# Patient Record
Sex: FEMALE | Race: WHITE | NOT HISPANIC OR LATINO
[De-identification: names, ages, dates, MRNs, and addresses within clinical notes are randomized per-mention and may not be internally consistent; named-entity substitution may affect disease eponyms.]

---

## 2017-08-25 ENCOUNTER — APPOINTMENT (OUTPATIENT)
Dept: SURGERY | Facility: CLINIC | Age: 37
End: 2017-08-25
Payer: COMMERCIAL

## 2017-08-25 VITALS — HEIGHT: 63 IN | WEIGHT: 170 LBS | BODY MASS INDEX: 30.12 KG/M2

## 2017-08-25 DIAGNOSIS — M67.441 GANGLION, RIGHT HAND: ICD-10-CM

## 2017-08-25 PROCEDURE — 99202 OFFICE O/P NEW SF 15 MIN: CPT

## 2017-08-25 RX ORDER — CEFADROXIL 500 MG/1
500 CAPSULE ORAL
Qty: 14 | Refills: 0 | Status: ACTIVE | COMMUNITY
Start: 2017-03-30

## 2017-08-25 RX ORDER — AMOXICILLIN 500 MG/1
500 CAPSULE ORAL
Qty: 21 | Refills: 0 | Status: ACTIVE | COMMUNITY
Start: 2017-08-05

## 2017-08-25 RX ORDER — CEFUROXIME AXETIL 500 MG/1
500 TABLET ORAL
Qty: 20 | Refills: 0 | Status: ACTIVE | COMMUNITY
Start: 2017-03-30

## 2017-08-25 RX ORDER — ALPRAZOLAM 0.25 MG/1
0.25 TABLET ORAL
Qty: 21 | Refills: 0 | Status: ACTIVE | COMMUNITY
Start: 2017-03-24

## 2017-08-25 RX ORDER — SERTRALINE HYDROCHLORIDE 100 MG/1
100 TABLET, FILM COATED ORAL
Qty: 90 | Refills: 0 | Status: ACTIVE | COMMUNITY
Start: 2017-06-12

## 2017-08-26 PROBLEM — M67.441 GANGLION OF HAND, RIGHT: Status: ACTIVE | Noted: 2017-08-26

## 2017-09-26 ENCOUNTER — EMERGENCY (EMERGENCY)
Facility: HOSPITAL | Age: 37
LOS: 1 days | Discharge: HOME | End: 2017-09-26
Admitting: INTERNAL MEDICINE
Payer: COMMERCIAL

## 2017-09-26 PROCEDURE — 76815 OB US LIMITED FETUS(S): CPT | Mod: 26

## 2017-09-26 PROCEDURE — 80048 BASIC METABOLIC PNL TOTAL CA: CPT

## 2017-09-26 PROCEDURE — 81025 URINE PREGNANCY TEST: CPT

## 2017-09-26 PROCEDURE — 86850 RBC ANTIBODY SCREEN: CPT

## 2017-09-26 PROCEDURE — 76815 OB US LIMITED FETUS(S): CPT

## 2017-09-26 PROCEDURE — 76817 TRANSVAGINAL US OBSTETRIC: CPT | Mod: 26

## 2017-09-26 PROCEDURE — 99285 EMERGENCY DEPT VISIT HI MDM: CPT

## 2017-09-26 PROCEDURE — 84702 CHORIONIC GONADOTROPIN TEST: CPT

## 2017-09-26 PROCEDURE — 76817 TRANSVAGINAL US OBSTETRIC: CPT

## 2017-09-26 PROCEDURE — 36415 COLL VENOUS BLD VENIPUNCTURE: CPT

## 2017-09-26 PROCEDURE — 82150 ASSAY OF AMYLASE: CPT

## 2017-09-26 PROCEDURE — 85610 PROTHROMBIN TIME: CPT

## 2017-09-26 PROCEDURE — 83690 ASSAY OF LIPASE: CPT

## 2017-09-26 PROCEDURE — 85027 COMPLETE CBC AUTOMATED: CPT

## 2017-09-26 PROCEDURE — 85730 THROMBOPLASTIN TIME PARTIAL: CPT

## 2017-09-26 PROCEDURE — 99284 EMERGENCY DEPT VISIT MOD MDM: CPT | Mod: 25

## 2017-09-26 PROCEDURE — 86900 BLOOD TYPING SEROLOGIC ABO: CPT

## 2017-09-26 PROCEDURE — 80076 HEPATIC FUNCTION PANEL: CPT

## 2017-09-26 PROCEDURE — 81003 URINALYSIS AUTO W/O SCOPE: CPT

## 2017-10-02 ENCOUNTER — RESULT REVIEW (OUTPATIENT)
Age: 37
End: 2017-10-02

## 2017-10-30 ENCOUNTER — APPOINTMENT (OUTPATIENT)
Dept: ANTEPARTUM | Facility: CLINIC | Age: 37
End: 2017-10-30
Payer: COMMERCIAL

## 2017-10-30 ENCOUNTER — ASOB RESULT (OUTPATIENT)
Age: 37
End: 2017-10-30

## 2017-10-30 PROCEDURE — 76801 OB US < 14 WKS SINGLE FETUS: CPT

## 2017-10-30 PROCEDURE — 76813 OB US NUCHAL MEAS 1 GEST: CPT

## 2017-11-04 ENCOUNTER — EMERGENCY (EMERGENCY)
Facility: HOSPITAL | Age: 37
LOS: 1 days | Discharge: HOME | End: 2017-11-04
Admitting: EMERGENCY MEDICINE
Payer: COMMERCIAL

## 2017-11-04 PROCEDURE — 99284 EMERGENCY DEPT VISIT MOD MDM: CPT

## 2017-11-05 PROCEDURE — 86850 RBC ANTIBODY SCREEN: CPT

## 2017-11-05 PROCEDURE — 85610 PROTHROMBIN TIME: CPT

## 2017-11-05 PROCEDURE — 86900 BLOOD TYPING SEROLOGIC ABO: CPT

## 2017-11-05 PROCEDURE — 99284 EMERGENCY DEPT VISIT MOD MDM: CPT | Mod: 25

## 2017-11-05 PROCEDURE — 85730 THROMBOPLASTIN TIME PARTIAL: CPT

## 2017-11-05 PROCEDURE — 87086 URINE CULTURE/COLONY COUNT: CPT

## 2017-11-05 PROCEDURE — 96374 THER/PROPH/DIAG INJ IV PUSH: CPT

## 2017-11-05 PROCEDURE — 96375 TX/PRO/DX INJ NEW DRUG ADDON: CPT

## 2017-11-05 PROCEDURE — 81003 URINALYSIS AUTO W/O SCOPE: CPT

## 2017-11-05 PROCEDURE — 80053 COMPREHEN METABOLIC PANEL: CPT

## 2017-11-05 PROCEDURE — 85027 COMPLETE CBC AUTOMATED: CPT

## 2017-11-05 PROCEDURE — 84703 CHORIONIC GONADOTROPIN ASSAY: CPT

## 2018-01-04 ENCOUNTER — APPOINTMENT (OUTPATIENT)
Dept: ANTEPARTUM | Facility: CLINIC | Age: 38
End: 2018-01-04

## 2018-01-10 ENCOUNTER — APPOINTMENT (OUTPATIENT)
Dept: ANTEPARTUM | Facility: CLINIC | Age: 38
End: 2018-01-10
Payer: COMMERCIAL

## 2018-01-10 ENCOUNTER — ASOB RESULT (OUTPATIENT)
Age: 38
End: 2018-01-10

## 2018-01-10 PROCEDURE — 76811 OB US DETAILED SNGL FETUS: CPT

## 2018-01-10 PROCEDURE — 76817 TRANSVAGINAL US OBSTETRIC: CPT

## 2018-01-31 ENCOUNTER — OUTPATIENT (OUTPATIENT)
Dept: OUTPATIENT SERVICES | Facility: HOSPITAL | Age: 38
LOS: 1 days | End: 2018-01-31
Payer: COMMERCIAL

## 2018-01-31 DIAGNOSIS — Z3A.00 WEEKS OF GESTATION OF PREGNANCY NOT SPECIFIED: ICD-10-CM

## 2018-01-31 DIAGNOSIS — O26.899 OTHER SPECIFIED PREGNANCY RELATED CONDITIONS, UNSPECIFIED TRIMESTER: ICD-10-CM

## 2018-01-31 LAB
APPEARANCE UR: CLEAR — SIGNIFICANT CHANGE UP
BILIRUB UR-MCNC: NEGATIVE — SIGNIFICANT CHANGE UP
COLOR SPEC: SIGNIFICANT CHANGE UP
DIFF PNL FLD: NEGATIVE — SIGNIFICANT CHANGE UP
GLUCOSE UR QL: NEGATIVE — SIGNIFICANT CHANGE UP
KETONES UR-MCNC: NEGATIVE — SIGNIFICANT CHANGE UP
LEUKOCYTE ESTERASE UR-ACNC: NEGATIVE — SIGNIFICANT CHANGE UP
NITRITE UR-MCNC: NEGATIVE — SIGNIFICANT CHANGE UP
PH UR: 6.5 — SIGNIFICANT CHANGE UP (ref 5–8)
PROT UR-MCNC: NEGATIVE — SIGNIFICANT CHANGE UP
SP GR SPEC: 1.02 — SIGNIFICANT CHANGE UP (ref 1.01–1.02)
UROBILINOGEN FLD QL: NEGATIVE — SIGNIFICANT CHANGE UP

## 2018-01-31 PROCEDURE — 81003 URINALYSIS AUTO W/O SCOPE: CPT

## 2018-01-31 PROCEDURE — 87086 URINE CULTURE/COLONY COUNT: CPT

## 2018-01-31 PROCEDURE — 59025 FETAL NON-STRESS TEST: CPT

## 2018-01-31 PROCEDURE — G0463: CPT

## 2018-02-01 LAB
CULTURE RESULTS: NO GROWTH — SIGNIFICANT CHANGE UP
SPECIMEN SOURCE: SIGNIFICANT CHANGE UP

## 2018-02-06 ENCOUNTER — APPOINTMENT (OUTPATIENT)
Dept: ANTEPARTUM | Facility: CLINIC | Age: 38
End: 2018-02-06
Payer: COMMERCIAL

## 2018-02-06 ENCOUNTER — ASOB RESULT (OUTPATIENT)
Age: 38
End: 2018-02-06

## 2018-02-06 PROCEDURE — 76816 OB US FOLLOW-UP PER FETUS: CPT

## 2018-02-06 PROCEDURE — 76817 TRANSVAGINAL US OBSTETRIC: CPT

## 2018-04-05 ENCOUNTER — OUTPATIENT (OUTPATIENT)
Dept: OUTPATIENT SERVICES | Facility: HOSPITAL | Age: 38
LOS: 1 days | End: 2018-04-05
Payer: COMMERCIAL

## 2018-04-05 DIAGNOSIS — Z3A.00 WEEKS OF GESTATION OF PREGNANCY NOT SPECIFIED: ICD-10-CM

## 2018-04-05 DIAGNOSIS — O26.899 OTHER SPECIFIED PREGNANCY RELATED CONDITIONS, UNSPECIFIED TRIMESTER: ICD-10-CM

## 2018-04-05 LAB
APPEARANCE UR: ABNORMAL
BACTERIA # UR AUTO: ABNORMAL /HPF
BILIRUB UR-MCNC: NEGATIVE — SIGNIFICANT CHANGE UP
COLOR SPEC: YELLOW — SIGNIFICANT CHANGE UP
DIFF PNL FLD: NEGATIVE — SIGNIFICANT CHANGE UP
EPI CELLS # UR: ABNORMAL /HPF
GLUCOSE UR QL: NEGATIVE — SIGNIFICANT CHANGE UP
HCT VFR BLD CALC: 30.4 % — LOW (ref 34.5–45)
HGB BLD-MCNC: 10.8 G/DL — LOW (ref 11.5–15.5)
KETONES UR-MCNC: NEGATIVE — SIGNIFICANT CHANGE UP
LEUKOCYTE ESTERASE UR-ACNC: NEGATIVE — SIGNIFICANT CHANGE UP
MCHC RBC-ENTMCNC: 30.8 PG — SIGNIFICANT CHANGE UP (ref 27–34)
MCHC RBC-ENTMCNC: 35.5 GM/DL — SIGNIFICANT CHANGE UP (ref 32–36)
MCV RBC AUTO: 86.9 FL — SIGNIFICANT CHANGE UP (ref 80–100)
NITRITE UR-MCNC: NEGATIVE — SIGNIFICANT CHANGE UP
PH UR: 6.5 — SIGNIFICANT CHANGE UP (ref 5–8)
PLATELET # BLD AUTO: 237 K/UL — SIGNIFICANT CHANGE UP (ref 150–400)
PROT UR-MCNC: SIGNIFICANT CHANGE UP
RBC # BLD: 3.5 M/UL — LOW (ref 3.8–5.2)
RBC # FLD: 14.4 % — SIGNIFICANT CHANGE UP (ref 10.3–14.5)
SP GR SPEC: 1.02 — SIGNIFICANT CHANGE UP (ref 1.01–1.02)
UROBILINOGEN FLD QL: NEGATIVE — SIGNIFICANT CHANGE UP
WBC # BLD: 8.2 K/UL — SIGNIFICANT CHANGE UP (ref 3.8–10.5)
WBC # FLD AUTO: 8.2 K/UL — SIGNIFICANT CHANGE UP (ref 3.8–10.5)
WBC UR QL: SIGNIFICANT CHANGE UP /HPF (ref 0–5)

## 2018-04-05 PROCEDURE — 85027 COMPLETE CBC AUTOMATED: CPT

## 2018-04-05 PROCEDURE — 59025 FETAL NON-STRESS TEST: CPT

## 2018-04-05 PROCEDURE — G0463: CPT

## 2018-04-05 PROCEDURE — 87086 URINE CULTURE/COLONY COUNT: CPT

## 2018-04-05 PROCEDURE — 81001 URINALYSIS AUTO W/SCOPE: CPT

## 2018-04-06 LAB
CULTURE RESULTS: NO GROWTH — SIGNIFICANT CHANGE UP
SPECIMEN SOURCE: SIGNIFICANT CHANGE UP

## 2018-04-17 ENCOUNTER — APPOINTMENT (OUTPATIENT)
Dept: ANTEPARTUM | Facility: CLINIC | Age: 38
End: 2018-04-17
Payer: COMMERCIAL

## 2018-04-17 ENCOUNTER — ASOB RESULT (OUTPATIENT)
Age: 38
End: 2018-04-17

## 2018-04-17 PROCEDURE — 76816 OB US FOLLOW-UP PER FETUS: CPT

## 2018-05-04 ENCOUNTER — OUTPATIENT (OUTPATIENT)
Dept: OUTPATIENT SERVICES | Facility: HOSPITAL | Age: 38
LOS: 1 days | End: 2018-05-04
Payer: COMMERCIAL

## 2018-05-04 DIAGNOSIS — O26.899 OTHER SPECIFIED PREGNANCY RELATED CONDITIONS, UNSPECIFIED TRIMESTER: ICD-10-CM

## 2018-05-04 DIAGNOSIS — Z3A.00 WEEKS OF GESTATION OF PREGNANCY NOT SPECIFIED: ICD-10-CM

## 2018-05-13 ENCOUNTER — INPATIENT (INPATIENT)
Facility: HOSPITAL | Age: 38
LOS: 1 days | Discharge: HOME | End: 2018-05-15
Attending: OBSTETRICS & GYNECOLOGY | Admitting: OBSTETRICS & GYNECOLOGY
Payer: COMMERCIAL

## 2018-05-13 VITALS — HEIGHT: 63 IN | WEIGHT: 180.78 LBS

## 2018-05-13 DIAGNOSIS — Z34.83 ENCOUNTER FOR SUPERVISION OF OTHER NORMAL PREGNANCY, THIRD TRIMESTER: ICD-10-CM

## 2018-05-13 LAB
BASOPHILS # BLD AUTO: 0 K/UL — SIGNIFICANT CHANGE UP (ref 0–0.2)
BASOPHILS NFR BLD AUTO: 0.1 % — SIGNIFICANT CHANGE UP (ref 0–2)
BLD GP AB SCN SERPL QL: NEGATIVE — SIGNIFICANT CHANGE UP
EOSINOPHIL # BLD AUTO: 0.1 K/UL — SIGNIFICANT CHANGE UP (ref 0–0.5)
EOSINOPHIL NFR BLD AUTO: 0.7 % — SIGNIFICANT CHANGE UP (ref 0–6)
HCT VFR BLD CALC: 31.4 % — LOW (ref 34.5–45)
HGB BLD-MCNC: 10.9 G/DL — LOW (ref 11.5–15.5)
LYMPHOCYTES # BLD AUTO: 1.8 K/UL — SIGNIFICANT CHANGE UP (ref 1–3.3)
LYMPHOCYTES # BLD AUTO: 21.8 % — SIGNIFICANT CHANGE UP (ref 13–44)
MCHC RBC-ENTMCNC: 29 PG — SIGNIFICANT CHANGE UP (ref 27–34)
MCHC RBC-ENTMCNC: 34.8 GM/DL — SIGNIFICANT CHANGE UP (ref 32–36)
MCV RBC AUTO: 83.5 FL — SIGNIFICANT CHANGE UP (ref 80–100)
MONOCYTES # BLD AUTO: 0.7 K/UL — SIGNIFICANT CHANGE UP (ref 0–0.9)
MONOCYTES NFR BLD AUTO: 9.1 % — SIGNIFICANT CHANGE UP (ref 2–14)
NEUTROPHILS # BLD AUTO: 5.6 K/UL — SIGNIFICANT CHANGE UP (ref 1.8–7.4)
NEUTROPHILS NFR BLD AUTO: 68.3 % — SIGNIFICANT CHANGE UP (ref 43–77)
PLATELET # BLD AUTO: 220 K/UL — SIGNIFICANT CHANGE UP (ref 150–400)
RBC # BLD: 3.76 M/UL — LOW (ref 3.8–5.2)
RBC # FLD: 14.4 % — SIGNIFICANT CHANGE UP (ref 10.3–14.5)
RH IG SCN BLD-IMP: POSITIVE — SIGNIFICANT CHANGE UP
WBC # BLD: 8.2 K/UL — SIGNIFICANT CHANGE UP (ref 3.8–10.5)
WBC # FLD AUTO: 8.2 K/UL — SIGNIFICANT CHANGE UP (ref 3.8–10.5)

## 2018-05-13 PROCEDURE — 59025 FETAL NON-STRESS TEST: CPT

## 2018-05-13 PROCEDURE — G0463: CPT

## 2018-05-13 RX ORDER — OXYTOCIN 10 UNIT/ML
4 VIAL (ML) INJECTION
Qty: 30 | Refills: 0 | Status: DISCONTINUED | OUTPATIENT
Start: 2018-05-13 | End: 2018-05-13

## 2018-05-13 RX ORDER — CITRIC ACID/SODIUM CITRATE 300-500 MG
15 SOLUTION, ORAL ORAL EVERY 4 HOURS
Qty: 0 | Refills: 0 | Status: DISCONTINUED | OUTPATIENT
Start: 2018-05-13 | End: 2018-05-13

## 2018-05-13 RX ORDER — OXYTOCIN 10 UNIT/ML
41.67 VIAL (ML) INJECTION
Qty: 20 | Refills: 0 | Status: DISCONTINUED | OUTPATIENT
Start: 2018-05-13 | End: 2018-05-13

## 2018-05-13 RX ORDER — LANOLIN
1 OINTMENT (GRAM) TOPICAL EVERY 6 HOURS
Qty: 0 | Refills: 0 | Status: DISCONTINUED | OUTPATIENT
Start: 2018-05-13 | End: 2018-05-15

## 2018-05-13 RX ORDER — SODIUM CHLORIDE 9 MG/ML
1000 INJECTION, SOLUTION INTRAVENOUS
Qty: 0 | Refills: 0 | Status: DISCONTINUED | OUTPATIENT
Start: 2018-05-13 | End: 2018-05-13

## 2018-05-13 RX ORDER — KETOROLAC TROMETHAMINE 30 MG/ML
30 SYRINGE (ML) INJECTION ONCE
Qty: 0 | Refills: 0 | Status: DISCONTINUED | OUTPATIENT
Start: 2018-05-13 | End: 2018-05-13

## 2018-05-13 RX ORDER — OXYTOCIN 10 UNIT/ML
333.33 VIAL (ML) INJECTION
Qty: 20 | Refills: 0 | Status: DISCONTINUED | OUTPATIENT
Start: 2018-05-13 | End: 2018-05-15

## 2018-05-13 RX ORDER — SERTRALINE 25 MG/1
100 TABLET, FILM COATED ORAL DAILY
Qty: 0 | Refills: 0 | Status: DISCONTINUED | OUTPATIENT
Start: 2018-05-13 | End: 2018-05-15

## 2018-05-13 RX ORDER — OXYTOCIN 10 UNIT/ML
333.33 VIAL (ML) INJECTION
Qty: 20 | Refills: 0 | Status: COMPLETED | OUTPATIENT
Start: 2018-05-13

## 2018-05-13 RX ORDER — SODIUM CHLORIDE 9 MG/ML
1000 INJECTION, SOLUTION INTRAVENOUS ONCE
Qty: 0 | Refills: 0 | Status: COMPLETED | OUTPATIENT
Start: 2018-05-13 | End: 2018-05-13

## 2018-05-13 RX ORDER — IBUPROFEN 200 MG
600 TABLET ORAL EVERY 6 HOURS
Qty: 0 | Refills: 0 | Status: COMPLETED | OUTPATIENT
Start: 2018-05-13 | End: 2019-04-11

## 2018-05-13 RX ORDER — GLYCERIN ADULT
1 SUPPOSITORY, RECTAL RECTAL AT BEDTIME
Qty: 0 | Refills: 0 | Status: DISCONTINUED | OUTPATIENT
Start: 2018-05-13 | End: 2018-05-15

## 2018-05-13 RX ORDER — OXYTOCIN 10 UNIT/ML
4 VIAL (ML) INJECTION
Qty: 30 | Refills: 0 | Status: DISCONTINUED | OUTPATIENT
Start: 2018-05-13 | End: 2018-05-15

## 2018-05-13 RX ORDER — OXYCODONE HYDROCHLORIDE 5 MG/1
5 TABLET ORAL
Qty: 0 | Refills: 0 | Status: DISCONTINUED | OUTPATIENT
Start: 2018-05-13 | End: 2018-05-15

## 2018-05-13 RX ORDER — SODIUM CHLORIDE 9 MG/ML
3 INJECTION INTRAMUSCULAR; INTRAVENOUS; SUBCUTANEOUS EVERY 8 HOURS
Qty: 0 | Refills: 0 | Status: DISCONTINUED | OUTPATIENT
Start: 2018-05-13 | End: 2018-05-19

## 2018-05-13 RX ORDER — KETOROLAC TROMETHAMINE 30 MG/ML
30 SYRINGE (ML) INJECTION ONCE
Qty: 0 | Refills: 0 | Status: DISCONTINUED | OUTPATIENT
Start: 2018-05-13 | End: 2018-05-19

## 2018-05-13 RX ORDER — DIBUCAINE 1 %
1 OINTMENT (GRAM) RECTAL EVERY 4 HOURS
Qty: 0 | Refills: 0 | Status: DISCONTINUED | OUTPATIENT
Start: 2018-05-13 | End: 2018-05-19

## 2018-05-13 RX ORDER — ACETAMINOPHEN 500 MG
975 TABLET ORAL EVERY 6 HOURS
Qty: 0 | Refills: 0 | Status: COMPLETED | OUTPATIENT
Start: 2018-05-13 | End: 2019-04-11

## 2018-05-13 RX ORDER — DIPHENHYDRAMINE HCL 50 MG
25 CAPSULE ORAL EVERY 6 HOURS
Qty: 0 | Refills: 0 | Status: DISCONTINUED | OUTPATIENT
Start: 2018-05-13 | End: 2018-05-15

## 2018-05-13 RX ORDER — PRAMOXINE HYDROCHLORIDE 150 MG/15G
1 AEROSOL, FOAM RECTAL EVERY 4 HOURS
Qty: 0 | Refills: 0 | Status: DISCONTINUED | OUTPATIENT
Start: 2018-05-13 | End: 2018-05-19

## 2018-05-13 RX ORDER — OXYCODONE HYDROCHLORIDE 5 MG/1
5 TABLET ORAL EVERY 4 HOURS
Qty: 0 | Refills: 0 | Status: DISCONTINUED | OUTPATIENT
Start: 2018-05-13 | End: 2018-05-15

## 2018-05-13 RX ORDER — MAGNESIUM HYDROXIDE 400 MG/1
30 TABLET, CHEWABLE ORAL
Qty: 0 | Refills: 0 | Status: DISCONTINUED | OUTPATIENT
Start: 2018-05-13 | End: 2018-05-15

## 2018-05-13 RX ORDER — OXYCODONE HYDROCHLORIDE 5 MG/1
5 TABLET ORAL
Qty: 0 | Refills: 0 | Status: COMPLETED | OUTPATIENT
Start: 2018-05-13 | End: 2018-05-20

## 2018-05-13 RX ORDER — TETANUS TOXOID, REDUCED DIPHTHERIA TOXOID AND ACELLULAR PERTUSSIS VACCINE, ADSORBED 5; 2.5; 8; 8; 2.5 [IU]/.5ML; [IU]/.5ML; UG/.5ML; UG/.5ML; UG/.5ML
0.5 SUSPENSION INTRAMUSCULAR ONCE
Qty: 0 | Refills: 0 | Status: DISCONTINUED | OUTPATIENT
Start: 2018-05-13 | End: 2018-05-15

## 2018-05-13 RX ORDER — OXYTOCIN 10 UNIT/ML
41.67 VIAL (ML) INJECTION
Qty: 20 | Refills: 0 | Status: DISCONTINUED | OUTPATIENT
Start: 2018-05-13 | End: 2018-05-19

## 2018-05-13 RX ORDER — SODIUM CHLORIDE 9 MG/ML
3 INJECTION INTRAMUSCULAR; INTRAVENOUS; SUBCUTANEOUS EVERY 8 HOURS
Qty: 0 | Refills: 0 | Status: DISCONTINUED | OUTPATIENT
Start: 2018-05-13 | End: 2018-05-13

## 2018-05-13 RX ORDER — DIBUCAINE 1 %
1 OINTMENT (GRAM) RECTAL EVERY 4 HOURS
Qty: 0 | Refills: 0 | Status: DISCONTINUED | OUTPATIENT
Start: 2018-05-13 | End: 2018-05-13

## 2018-05-13 RX ORDER — IBUPROFEN 200 MG
600 TABLET ORAL EVERY 6 HOURS
Qty: 0 | Refills: 0 | Status: DISCONTINUED | OUTPATIENT
Start: 2018-05-13 | End: 2018-05-19

## 2018-05-13 RX ORDER — SIMETHICONE 80 MG/1
80 TABLET, CHEWABLE ORAL EVERY 6 HOURS
Qty: 0 | Refills: 0 | Status: DISCONTINUED | OUTPATIENT
Start: 2018-05-13 | End: 2018-05-15

## 2018-05-13 RX ORDER — HYDROCORTISONE 1 %
1 OINTMENT (GRAM) TOPICAL EVERY 4 HOURS
Qty: 0 | Refills: 0 | Status: DISCONTINUED | OUTPATIENT
Start: 2018-05-13 | End: 2018-05-13

## 2018-05-13 RX ORDER — PRAMOXINE HYDROCHLORIDE 150 MG/15G
1 AEROSOL, FOAM RECTAL EVERY 4 HOURS
Qty: 0 | Refills: 0 | Status: DISCONTINUED | OUTPATIENT
Start: 2018-05-13 | End: 2018-05-13

## 2018-05-13 RX ORDER — DOCUSATE SODIUM 100 MG
100 CAPSULE ORAL
Qty: 0 | Refills: 0 | Status: DISCONTINUED | OUTPATIENT
Start: 2018-05-13 | End: 2018-05-15

## 2018-05-13 RX ORDER — HYDROCORTISONE 1 %
1 OINTMENT (GRAM) TOPICAL EVERY 4 HOURS
Qty: 0 | Refills: 0 | Status: DISCONTINUED | OUTPATIENT
Start: 2018-05-13 | End: 2018-05-19

## 2018-05-13 RX ORDER — ACETAMINOPHEN 500 MG
975 TABLET ORAL EVERY 6 HOURS
Qty: 0 | Refills: 0 | Status: DISCONTINUED | OUTPATIENT
Start: 2018-05-13 | End: 2018-05-15

## 2018-05-13 RX ORDER — AER TRAVELER 0.5 G/1
1 SOLUTION RECTAL; TOPICAL EVERY 4 HOURS
Qty: 0 | Refills: 0 | Status: DISCONTINUED | OUTPATIENT
Start: 2018-05-13 | End: 2018-05-13

## 2018-05-13 RX ORDER — OXYCODONE HYDROCHLORIDE 5 MG/1
5 TABLET ORAL EVERY 4 HOURS
Qty: 0 | Refills: 0 | Status: DISCONTINUED | OUTPATIENT
Start: 2018-05-13 | End: 2018-05-13

## 2018-05-13 RX ORDER — IBUPROFEN 200 MG
600 TABLET ORAL EVERY 6 HOURS
Qty: 0 | Refills: 0 | Status: DISCONTINUED | OUTPATIENT
Start: 2018-05-13 | End: 2018-05-15

## 2018-05-13 RX ORDER — AER TRAVELER 0.5 G/1
1 SOLUTION RECTAL; TOPICAL EVERY 4 HOURS
Qty: 0 | Refills: 0 | Status: DISCONTINUED | OUTPATIENT
Start: 2018-05-13 | End: 2018-05-19

## 2018-05-13 RX ORDER — ACETAMINOPHEN 500 MG
975 TABLET ORAL EVERY 6 HOURS
Qty: 0 | Refills: 0 | Status: DISCONTINUED | OUTPATIENT
Start: 2018-05-13 | End: 2018-05-19

## 2018-05-13 RX ORDER — OXYTOCIN 10 UNIT/ML
41.67 VIAL (ML) INJECTION
Qty: 20 | Refills: 0 | Status: DISCONTINUED | OUTPATIENT
Start: 2018-05-13 | End: 2018-05-15

## 2018-05-13 RX ADMIN — Medication 4 MILLIUNIT(S)/MIN: at 09:34

## 2018-05-13 RX ADMIN — SODIUM CHLORIDE 2000 MILLILITER(S): 9 INJECTION, SOLUTION INTRAVENOUS at 06:39

## 2018-05-13 RX ADMIN — SODIUM CHLORIDE 250 MILLILITER(S): 9 INJECTION, SOLUTION INTRAVENOUS at 06:41

## 2018-05-13 RX ADMIN — SERTRALINE 100 MILLIGRAM(S): 25 TABLET, FILM COATED ORAL at 13:50

## 2018-05-13 RX ADMIN — Medication 30 MILLIGRAM(S): at 12:45

## 2018-05-13 RX ADMIN — Medication 30 MILLIGRAM(S): at 11:56

## 2018-05-13 RX ADMIN — Medication 4 MILLIUNIT(S)/MIN: at 07:08

## 2018-05-14 ENCOUNTER — TRANSCRIPTION ENCOUNTER (OUTPATIENT)
Age: 38
End: 2018-05-14

## 2018-05-14 DIAGNOSIS — F41.9 ANXIETY DISORDER, UNSPECIFIED: ICD-10-CM

## 2018-05-14 LAB
HCT VFR BLD CALC: 29.6 % — LOW (ref 34.5–45)
HGB BLD-MCNC: 10 G/DL — LOW (ref 11.5–15.5)
T PALLIDUM AB TITR SER: NEGATIVE — SIGNIFICANT CHANGE UP

## 2018-05-14 RX ORDER — ACETAMINOPHEN 500 MG
3 TABLET ORAL
Qty: 0 | Refills: 0 | COMMUNITY
Start: 2018-05-14

## 2018-05-14 RX ORDER — SERTRALINE 25 MG/1
1 TABLET, FILM COATED ORAL
Qty: 0 | Refills: 0 | COMMUNITY

## 2018-05-14 RX ORDER — LANOLIN
1 OINTMENT (GRAM) TOPICAL
Qty: 0 | Refills: 0 | COMMUNITY
Start: 2018-05-14

## 2018-05-14 RX ORDER — SERTRALINE 25 MG/1
1 TABLET, FILM COATED ORAL
Qty: 0 | Refills: 0 | COMMUNITY
Start: 2018-05-14

## 2018-05-14 RX ORDER — IBUPROFEN 200 MG
1 TABLET ORAL
Qty: 0 | Refills: 0 | COMMUNITY
Start: 2018-05-14

## 2018-05-14 RX ADMIN — SERTRALINE 100 MILLIGRAM(S): 25 TABLET, FILM COATED ORAL at 12:18

## 2018-05-14 RX ADMIN — Medication 600 MILLIGRAM(S): at 06:00

## 2018-05-14 RX ADMIN — Medication 600 MILLIGRAM(S): at 18:31

## 2018-05-14 RX ADMIN — Medication 1 TABLET(S): at 12:19

## 2018-05-14 RX ADMIN — Medication 100 MILLIGRAM(S): at 05:01

## 2018-05-14 RX ADMIN — Medication 600 MILLIGRAM(S): at 05:01

## 2018-05-14 RX ADMIN — Medication 600 MILLIGRAM(S): at 16:24

## 2018-05-14 NOTE — DISCHARGE NOTE OB - CARE PLAN
Principal Discharge DX:	Vaginal delivery  Goal:	back to baseline function  Assessment and plan of treatment:	activity as tolerated, follow up in office

## 2018-05-14 NOTE — DISCHARGE NOTE OB - MEDICATION SUMMARY - MEDICATIONS TO TAKE
I will START or STAY ON the medications listed below when I get home from the hospital:    acetaminophen 325 mg oral tablet  -- 3 tab(s) by mouth every 6 hours  -- Indication: For Vaginal delivery    ibuprofen 600 mg oral tablet  -- 1 tab(s) by mouth every 6 hours  -- Indication: For Vaginal delivery    sertraline 100 mg oral tablet  -- 1 tab(s) by mouth once a day  -- Indication: For Anxiety    lanolin topical ointment  -- 1 application on skin every 6 hours, As needed, Sore Nipples  -- Indication: For Vaginal delivery    Prenatal Multivitamins with Folic Acid 1 mg oral tablet  -- 1 tab(s) by mouth once a day  -- Indication: For Vaginal delivery

## 2018-05-14 NOTE — DISCHARGE NOTE OB - PATIENT PORTAL LINK FT
You can access the [facility] Patient Portal, offered by [facility], by registering with the following website: [email or web address]

## 2018-05-14 NOTE — PROGRESS NOTE ADULT - SUBJECTIVE AND OBJECTIVE BOX
Postpartum Note- PPD#1    Allergies    No Known Allergies    Intolerances      Prenatal Labs:    Rubella IgG:   Immune               RPR: Negative                      Blood Type: 0+    S:Patient is a  37y        PPD#1         S/P       Patient w/o complaints, pain is controlled.    Pt is OOB, tolerating PO, passing flatus. Lochia WNL.   Feeding: Breastfeeding    O:  Vital Signs Last 24 Hrs  T(C): 36.7 (14 May 2018 05:00), Max: 37.1 (13 May 2018 11:00)  T(F): 98 (14 May 2018 05:00), Max: 98.8 (13 May 2018 11:00)  HR: 77 (14 May 2018 05:00) (75 - 90)  BP: 133/86 (14 May 2018 05:00) (104/63 - 138/76)  BP(mean): --  RR: 18 (14 May 2018 05:00) (17 - 20)  SpO2: 98% (13 May 2018 14:35) (95% - 100%)     Gen: NAD  CV: rrr s1s2, CTABL  Abdomen: Soft, nontender, non-distended, fundus firm.  Lochia: WNL  Perineum: RML with additional first degree perineal laceration  Ext: Neg edema, Neg calf tenderness.  Pedal pulses palpated B/L    LABS:    Hemoglobin: 10.0 g/dL ( @ 06:08)  Hemoglobin: 10.9 g/dL ( @ 06:40)      Hematocrit: 29.6 % ( @ 06:08)  Hematocrit: 31.4 % ( @ 06:40)      A/P:  37y  PPD # 1      S/P     doing well    PMHx: () left ovarian cystectomy  Current Issues: anxiety currently on sertraline 100mg daily    Increase OOB  Regular diet  PO Pain protocol  AM H&H  Routine Postpartum Care

## 2018-05-15 VITALS
RESPIRATION RATE: 18 BRPM | SYSTOLIC BLOOD PRESSURE: 132 MMHG | HEART RATE: 88 BPM | DIASTOLIC BLOOD PRESSURE: 88 MMHG | TEMPERATURE: 98 F

## 2018-05-15 PROCEDURE — 86850 RBC ANTIBODY SCREEN: CPT

## 2018-05-15 PROCEDURE — 85018 HEMOGLOBIN: CPT

## 2018-05-15 PROCEDURE — 85027 COMPLETE CBC AUTOMATED: CPT

## 2018-05-15 PROCEDURE — 86900 BLOOD TYPING SEROLOGIC ABO: CPT

## 2018-05-15 PROCEDURE — 86901 BLOOD TYPING SEROLOGIC RH(D): CPT

## 2018-05-15 PROCEDURE — 59050 FETAL MONITOR W/REPORT: CPT

## 2018-05-15 PROCEDURE — 59025 FETAL NON-STRESS TEST: CPT

## 2018-05-15 PROCEDURE — 85014 HEMATOCRIT: CPT

## 2018-05-15 PROCEDURE — 86780 TREPONEMA PALLIDUM: CPT

## 2018-05-15 RX ADMIN — Medication 600 MILLIGRAM(S): at 05:20

## 2018-05-15 NOTE — PROGRESS NOTE ADULT - SUBJECTIVE AND OBJECTIVE BOX
Attg Note:  PPD 2    S: Patient doing well. Minimal lochia. Pain controlled.    O: Vital Signs Last 24 Hrs  T(C): 36.5 (15 May 2018 05:24), Max: 36.8 (14 May 2018 18:00)  T(F): 97.7 (15 May 2018 05:24), Max: 98.2 (14 May 2018 18:00)  HR: 88 (15 May 2018 05:24) (81 - 88)  BP: 132/88 (15 May 2018 05:24) (121/80 - 132/88)  BP(mean): --  RR: 18 (15 May 2018 05:24) (18 - 18)  SpO2: --    Gen: NAD  Abd: soft, NT, ND, fundus firm below umbilicus  Lochia: moderate  Ext: no tenderness    Labs:                        10.0   x     )-----------( x        ( 14 May 2018 06:08 )             29.6       A: 37y PPD#2 s/p  doing well.    Plan:  routine PP care  discharge planning

## 2018-07-03 ENCOUNTER — EMERGENCY (EMERGENCY)
Facility: HOSPITAL | Age: 38
LOS: 1 days | Discharge: HOME | End: 2018-07-03
Attending: EMERGENCY MEDICINE | Admitting: EMERGENCY MEDICINE
Payer: COMMERCIAL

## 2018-07-03 VITALS
TEMPERATURE: 98 F | WEIGHT: 151.9 LBS | RESPIRATION RATE: 18 BRPM | SYSTOLIC BLOOD PRESSURE: 138 MMHG | DIASTOLIC BLOOD PRESSURE: 90 MMHG | HEIGHT: 64 IN | HEART RATE: 88 BPM

## 2018-07-03 PROCEDURE — 99283 EMERGENCY DEPT VISIT LOW MDM: CPT | Mod: 25

## 2018-07-03 PROCEDURE — 99283 EMERGENCY DEPT VISIT LOW MDM: CPT

## 2018-07-03 RX ADMIN — Medication 1 TABLET(S): at 07:25

## 2018-07-03 NOTE — ED PROVIDER NOTE - OBJECTIVE STATEMENT
38F  7 weeks post , breast feeding, p/w bilateral breast pain x 3 days, worse in right breast, fevers, chills and bodyaches x 1 day. Tmax 102. No nausea, vomiting, cough, abdo pain, vag bleeding, dysuria or diarrhea.  Milk is the only nipple dc.  Last breast fed at 4am

## 2018-07-03 NOTE — ED PROVIDER NOTE - MEDICAL DECISION MAKING DETAILS
mastitis with systemic sx in a well appearing pt, advised pt to continue breast feeding and will dc with augmentin.

## 2018-07-03 NOTE — ED ADULT NURSE NOTE - OBJECTIVE STATEMENT
Patient states having breast pain x 3 days with fever that started yesterday. Patient states that she is breast feeding at this time. Denies any other symptoms.

## 2018-07-03 NOTE — ED PROVIDER NOTE - SKIN COLOR
chaperone PA student Reed, +clogged milk duct right breast 2 oclock position. +erythema and warmth up to 3cm at right breast 9 oclock position. No nipple dc.

## 2018-09-17 ENCOUNTER — RESULT REVIEW (OUTPATIENT)
Age: 38
End: 2018-09-17

## 2018-10-13 ENCOUNTER — OUTPATIENT (OUTPATIENT)
Dept: OUTPATIENT SERVICES | Facility: HOSPITAL | Age: 38
LOS: 1 days | End: 2018-10-13
Payer: COMMERCIAL

## 2018-10-13 ENCOUNTER — APPOINTMENT (OUTPATIENT)
Dept: ULTRASOUND IMAGING | Facility: HOSPITAL | Age: 38
End: 2018-10-13
Payer: COMMERCIAL

## 2018-10-13 DIAGNOSIS — Z00.8 ENCOUNTER FOR OTHER GENERAL EXAMINATION: ICD-10-CM

## 2018-10-13 PROCEDURE — 76830 TRANSVAGINAL US NON-OB: CPT

## 2018-10-13 PROCEDURE — 76856 US EXAM PELVIC COMPLETE: CPT | Mod: 26

## 2018-10-13 PROCEDURE — 76856 US EXAM PELVIC COMPLETE: CPT

## 2018-10-13 PROCEDURE — 76830 TRANSVAGINAL US NON-OB: CPT | Mod: 26

## 2019-03-30 ENCOUNTER — EMERGENCY (EMERGENCY)
Facility: HOSPITAL | Age: 39
LOS: 1 days | Discharge: HOME | End: 2019-03-30
Attending: EMERGENCY MEDICINE | Admitting: EMERGENCY MEDICINE
Payer: COMMERCIAL

## 2019-03-30 VITALS
SYSTOLIC BLOOD PRESSURE: 122 MMHG | HEART RATE: 68 BPM | OXYGEN SATURATION: 100 % | RESPIRATION RATE: 17 BRPM | TEMPERATURE: 100 F | DIASTOLIC BLOOD PRESSURE: 80 MMHG

## 2019-03-30 VITALS
SYSTOLIC BLOOD PRESSURE: 106 MMHG | OXYGEN SATURATION: 98 % | RESPIRATION RATE: 14 BRPM | HEART RATE: 79 BPM | TEMPERATURE: 97 F | WEIGHT: 154.98 LBS | HEIGHT: 63 IN | DIASTOLIC BLOOD PRESSURE: 68 MMHG

## 2019-03-30 DIAGNOSIS — K52.9 NONINFECTIVE GASTROENTERITIS AND COLITIS, UNSPECIFIED: ICD-10-CM

## 2019-03-30 DIAGNOSIS — R11.10 VOMITING, UNSPECIFIED: ICD-10-CM

## 2019-03-30 LAB
ALBUMIN SERPL ELPH-MCNC: 4.1 G/DL — SIGNIFICANT CHANGE UP (ref 3.3–5)
ALP SERPL-CCNC: 57 U/L — SIGNIFICANT CHANGE UP (ref 40–120)
ALT FLD-CCNC: 17 U/L DA — SIGNIFICANT CHANGE UP (ref 10–45)
ANION GAP SERPL CALC-SCNC: 11 MMOL/L — SIGNIFICANT CHANGE UP (ref 5–17)
AST SERPL-CCNC: 15 U/L — SIGNIFICANT CHANGE UP (ref 10–40)
BASOPHILS # BLD AUTO: 0.1 K/UL — SIGNIFICANT CHANGE UP (ref 0–0.2)
BASOPHILS NFR BLD AUTO: 0.4 % — SIGNIFICANT CHANGE UP (ref 0–2)
BILIRUB SERPL-MCNC: 0.3 MG/DL — SIGNIFICANT CHANGE UP (ref 0.2–1.2)
BUN SERPL-MCNC: 17 MG/DL — SIGNIFICANT CHANGE UP (ref 7–23)
CALCIUM SERPL-MCNC: 9.4 MG/DL — SIGNIFICANT CHANGE UP (ref 8.4–10.5)
CHLORIDE SERPL-SCNC: 103 MMOL/L — SIGNIFICANT CHANGE UP (ref 96–108)
CO2 SERPL-SCNC: 26 MMOL/L — SIGNIFICANT CHANGE UP (ref 22–31)
CREAT SERPL-MCNC: 0.82 MG/DL — SIGNIFICANT CHANGE UP (ref 0.5–1.3)
EOSINOPHIL # BLD AUTO: 0.1 K/UL — SIGNIFICANT CHANGE UP (ref 0–0.5)
EOSINOPHIL NFR BLD AUTO: 0.3 % — SIGNIFICANT CHANGE UP (ref 0–6)
GLUCOSE SERPL-MCNC: 125 MG/DL — HIGH (ref 70–99)
HCG SERPL-ACNC: <1 MIU/ML — SIGNIFICANT CHANGE UP
HCT VFR BLD CALC: 43.6 % — SIGNIFICANT CHANGE UP (ref 34.5–45)
HGB BLD-MCNC: 14.6 G/DL — SIGNIFICANT CHANGE UP (ref 11.5–15.5)
LYMPHOCYTES # BLD AUTO: 13.2 % — SIGNIFICANT CHANGE UP (ref 13–44)
LYMPHOCYTES # BLD AUTO: 2 K/UL — SIGNIFICANT CHANGE UP (ref 1–3.3)
MCHC RBC-ENTMCNC: 29.5 PG — SIGNIFICANT CHANGE UP (ref 27–34)
MCHC RBC-ENTMCNC: 33.5 GM/DL — SIGNIFICANT CHANGE UP (ref 32–36)
MCV RBC AUTO: 88.1 FL — SIGNIFICANT CHANGE UP (ref 80–100)
MONOCYTES # BLD AUTO: 1.4 K/UL — HIGH (ref 0–0.9)
MONOCYTES NFR BLD AUTO: 9.1 % — SIGNIFICANT CHANGE UP (ref 2–14)
NEUTROPHILS # BLD AUTO: 11.6 K/UL — HIGH (ref 1.8–7.4)
NEUTROPHILS NFR BLD AUTO: 76.9 % — SIGNIFICANT CHANGE UP (ref 43–77)
PLATELET # BLD AUTO: 288 K/UL — SIGNIFICANT CHANGE UP (ref 150–400)
POTASSIUM SERPL-MCNC: 3.8 MMOL/L — SIGNIFICANT CHANGE UP (ref 3.5–5.3)
POTASSIUM SERPL-SCNC: 3.8 MMOL/L — SIGNIFICANT CHANGE UP (ref 3.5–5.3)
PROT SERPL-MCNC: 8.9 G/DL — HIGH (ref 6–8.3)
RBC # BLD: 4.95 M/UL — SIGNIFICANT CHANGE UP (ref 3.8–5.2)
RBC # FLD: 12.4 % — SIGNIFICANT CHANGE UP (ref 10.3–14.5)
SODIUM SERPL-SCNC: 140 MMOL/L — SIGNIFICANT CHANGE UP (ref 135–145)
WBC # BLD: 15.1 K/UL — HIGH (ref 3.8–10.5)
WBC # FLD AUTO: 15.1 K/UL — HIGH (ref 3.8–10.5)

## 2019-03-30 PROCEDURE — 80053 COMPREHEN METABOLIC PANEL: CPT

## 2019-03-30 PROCEDURE — 99284 EMERGENCY DEPT VISIT MOD MDM: CPT | Mod: 25

## 2019-03-30 PROCEDURE — 84702 CHORIONIC GONADOTROPIN TEST: CPT

## 2019-03-30 PROCEDURE — 96375 TX/PRO/DX INJ NEW DRUG ADDON: CPT

## 2019-03-30 PROCEDURE — 85027 COMPLETE CBC AUTOMATED: CPT

## 2019-03-30 PROCEDURE — 96374 THER/PROPH/DIAG INJ IV PUSH: CPT

## 2019-03-30 RX ORDER — SODIUM CHLORIDE 9 MG/ML
1000 INJECTION INTRAMUSCULAR; INTRAVENOUS; SUBCUTANEOUS ONCE
Qty: 0 | Refills: 0 | Status: COMPLETED | OUTPATIENT
Start: 2019-03-30 | End: 2019-03-30

## 2019-03-30 RX ORDER — FAMOTIDINE 10 MG/ML
20 INJECTION INTRAVENOUS ONCE
Qty: 0 | Refills: 0 | Status: COMPLETED | OUTPATIENT
Start: 2019-03-30 | End: 2019-03-30

## 2019-03-30 RX ORDER — ONDANSETRON 8 MG/1
4 TABLET, FILM COATED ORAL ONCE
Qty: 0 | Refills: 0 | Status: COMPLETED | OUTPATIENT
Start: 2019-03-30 | End: 2019-03-30

## 2019-03-30 RX ADMIN — SODIUM CHLORIDE 2000 MILLILITER(S): 9 INJECTION INTRAMUSCULAR; INTRAVENOUS; SUBCUTANEOUS at 04:45

## 2019-03-30 RX ADMIN — SODIUM CHLORIDE 1000 MILLILITER(S): 9 INJECTION INTRAMUSCULAR; INTRAVENOUS; SUBCUTANEOUS at 06:09

## 2019-03-30 RX ADMIN — ONDANSETRON 4 MILLIGRAM(S): 8 TABLET, FILM COATED ORAL at 04:45

## 2019-03-30 RX ADMIN — FAMOTIDINE 100 MILLIGRAM(S): 10 INJECTION INTRAVENOUS at 04:49

## 2019-03-30 RX ADMIN — FAMOTIDINE 20 MILLIGRAM(S): 10 INJECTION INTRAVENOUS at 05:01

## 2019-03-30 NOTE — ED ADULT NURSE NOTE - NSIMPLEMENTINTERV_GEN_ALL_ED
Implemented All Universal Safety Interventions:  [place] to call system. Call bell, personal items and telephone within reach. Instruct patient to call for assistance. Room bathroom lighting operational. Non-slip footwear when patient is off stretcher. Physically safe environment: no spills, clutter or unnecessary equipment. Stretcher in lowest position, wheels locked, appropriate side rails in place. Implemented All Fall with Harm Risk Interventions:  [place] to call system. Call bell, personal items and telephone within reach. Instruct patient to call for assistance. Room bathroom lighting operational. Non-slip footwear when patient is off stretcher. Physically safe environment: no spills, clutter or unnecessary equipment. Stretcher in lowest position, wheels locked, appropriate side rails in place. Provide visual cue, wrist band, yellow gown, etc. Monitor gait and stability. Monitor for mental status changes and reorient to person, place, and time. Review medications for side effects contributing to fall risk. Reinforce activity limits and safety measures with patient and family. Provide visual clues: red socks.

## 2019-03-30 NOTE — ED ADULT TRIAGE NOTE - CHIEF COMPLAINT QUOTE
Pt presents to the ED with complaints of vomiting and diarrhea. Pt got out of bed to go to the bathroom and felt dizzy and weak then began to have vomiting and diarrhea. Per family pt had a near syncopal episode as well. Pt states she feels weak at triage.

## 2019-03-30 NOTE — ED PROVIDER NOTE - OBJECTIVE STATEMENT
Pertinent PMH/PSH/FHx/SHx and Review of Systems contained within:  39 y/o f with h/o anxiety BIB ems c/o few episodes of vomiting and diarrhea since last night after eating. has 3 yr old daughter who has similar symptoms

## 2019-11-12 NOTE — PATIENT PROFILE OB - TDAP IMMUNIZATION DATE
Please call the patient.  I am not sure if she has been aware of any of these formulary issues.     Her insurance does not cover her current Arnuity Elipta inhaler.   She needs to change to another similar inhaler.  I would recommend changing to Symbicort, 2 puffs twice per day.   Prescription(s) sent electronically to specified pharmacy.     Close encounter when done.    April

## 2020-02-11 ENCOUNTER — EMERGENCY (EMERGENCY)
Facility: HOSPITAL | Age: 40
LOS: 1 days | Discharge: HOME | End: 2020-02-11
Attending: INTERNAL MEDICINE | Admitting: INTERNAL MEDICINE
Payer: COMMERCIAL

## 2020-02-11 VITALS
RESPIRATION RATE: 18 BRPM | OXYGEN SATURATION: 98 % | HEIGHT: 64 IN | DIASTOLIC BLOOD PRESSURE: 95 MMHG | TEMPERATURE: 98 F | SYSTOLIC BLOOD PRESSURE: 147 MMHG | WEIGHT: 169.98 LBS | HEART RATE: 68 BPM

## 2020-02-11 VITALS
SYSTOLIC BLOOD PRESSURE: 129 MMHG | DIASTOLIC BLOOD PRESSURE: 80 MMHG | OXYGEN SATURATION: 100 % | HEART RATE: 71 BPM | RESPIRATION RATE: 15 BRPM

## 2020-02-11 DIAGNOSIS — R51 HEADACHE: ICD-10-CM

## 2020-02-11 PROBLEM — F41.9 ANXIETY DISORDER, UNSPECIFIED: Chronic | Status: ACTIVE | Noted: 2019-03-30

## 2020-02-11 LAB
ALBUMIN SERPL ELPH-MCNC: 3.5 G/DL — SIGNIFICANT CHANGE UP (ref 3.3–5)
ALP SERPL-CCNC: 47 U/L — SIGNIFICANT CHANGE UP (ref 40–120)
ALT FLD-CCNC: 17 U/L — SIGNIFICANT CHANGE UP (ref 10–45)
ANION GAP SERPL CALC-SCNC: 8 MMOL/L — SIGNIFICANT CHANGE UP (ref 5–17)
AST SERPL-CCNC: 16 U/L — SIGNIFICANT CHANGE UP (ref 10–40)
BILIRUB SERPL-MCNC: 0.4 MG/DL — SIGNIFICANT CHANGE UP (ref 0.2–1.2)
BUN SERPL-MCNC: 10 MG/DL — SIGNIFICANT CHANGE UP (ref 7–23)
CALCIUM SERPL-MCNC: 8.3 MG/DL — LOW (ref 8.4–10.5)
CHLORIDE SERPL-SCNC: 109 MMOL/L — HIGH (ref 96–108)
CO2 SERPL-SCNC: 27 MMOL/L — SIGNIFICANT CHANGE UP (ref 22–31)
CREAT SERPL-MCNC: 0.74 MG/DL — SIGNIFICANT CHANGE UP (ref 0.5–1.3)
GLUCOSE SERPL-MCNC: 92 MG/DL — SIGNIFICANT CHANGE UP (ref 70–99)
HCT VFR BLD CALC: 37.4 % — SIGNIFICANT CHANGE UP (ref 34.5–45)
HGB BLD-MCNC: 12.3 G/DL — SIGNIFICANT CHANGE UP (ref 11.5–15.5)
MCHC RBC-ENTMCNC: 28.9 PG — SIGNIFICANT CHANGE UP (ref 27–34)
MCHC RBC-ENTMCNC: 32.9 GM/DL — SIGNIFICANT CHANGE UP (ref 32–36)
MCV RBC AUTO: 88 FL — SIGNIFICANT CHANGE UP (ref 80–100)
NRBC # BLD: 0 /100 WBCS — SIGNIFICANT CHANGE UP (ref 0–0)
PLATELET # BLD AUTO: 276 K/UL — SIGNIFICANT CHANGE UP (ref 150–400)
POTASSIUM SERPL-MCNC: 3.7 MMOL/L — SIGNIFICANT CHANGE UP (ref 3.5–5.3)
POTASSIUM SERPL-SCNC: 3.7 MMOL/L — SIGNIFICANT CHANGE UP (ref 3.5–5.3)
PROT SERPL-MCNC: 8 G/DL — SIGNIFICANT CHANGE UP (ref 6–8.3)
RBC # BLD: 4.25 M/UL — SIGNIFICANT CHANGE UP (ref 3.8–5.2)
RBC # FLD: 13 % — SIGNIFICANT CHANGE UP (ref 10.3–14.5)
SODIUM SERPL-SCNC: 144 MMOL/L — SIGNIFICANT CHANGE UP (ref 135–145)
TROPONIN I SERPL-MCNC: <.017 NG/ML — LOW (ref 0.02–0.06)
WBC # BLD: 7.13 K/UL — SIGNIFICANT CHANGE UP (ref 3.8–10.5)
WBC # FLD AUTO: 7.13 K/UL — SIGNIFICANT CHANGE UP (ref 3.8–10.5)

## 2020-02-11 PROCEDURE — 93010 ELECTROCARDIOGRAM REPORT: CPT

## 2020-02-11 PROCEDURE — 96367 TX/PROPH/DG ADDL SEQ IV INF: CPT

## 2020-02-11 PROCEDURE — 36415 COLL VENOUS BLD VENIPUNCTURE: CPT

## 2020-02-11 PROCEDURE — 84484 ASSAY OF TROPONIN QUANT: CPT

## 2020-02-11 PROCEDURE — 96365 THER/PROPH/DIAG IV INF INIT: CPT

## 2020-02-11 PROCEDURE — 71045 X-RAY EXAM CHEST 1 VIEW: CPT | Mod: 26

## 2020-02-11 PROCEDURE — 99284 EMERGENCY DEPT VISIT MOD MDM: CPT | Mod: 25

## 2020-02-11 PROCEDURE — 71045 X-RAY EXAM CHEST 1 VIEW: CPT

## 2020-02-11 PROCEDURE — 96375 TX/PRO/DX INJ NEW DRUG ADDON: CPT

## 2020-02-11 PROCEDURE — 82962 GLUCOSE BLOOD TEST: CPT

## 2020-02-11 PROCEDURE — 70450 CT HEAD/BRAIN W/O DYE: CPT

## 2020-02-11 PROCEDURE — 80053 COMPREHEN METABOLIC PANEL: CPT

## 2020-02-11 PROCEDURE — 93005 ELECTROCARDIOGRAM TRACING: CPT

## 2020-02-11 PROCEDURE — 99285 EMERGENCY DEPT VISIT HI MDM: CPT

## 2020-02-11 PROCEDURE — 70450 CT HEAD/BRAIN W/O DYE: CPT | Mod: 26

## 2020-02-11 PROCEDURE — 85027 COMPLETE CBC AUTOMATED: CPT

## 2020-02-11 RX ORDER — ONDANSETRON 8 MG/1
1 TABLET, FILM COATED ORAL
Qty: 21 | Refills: 0
Start: 2020-02-11 | End: 2020-02-17

## 2020-02-11 RX ORDER — DEXAMETHASONE 0.5 MG/5ML
10 ELIXIR ORAL ONCE
Refills: 0 | Status: COMPLETED | OUTPATIENT
Start: 2020-02-11 | End: 2020-02-11

## 2020-02-11 RX ORDER — DIPHENHYDRAMINE HCL 50 MG
25 CAPSULE ORAL ONCE
Refills: 0 | Status: COMPLETED | OUTPATIENT
Start: 2020-02-11 | End: 2020-02-11

## 2020-02-11 RX ORDER — METOCLOPRAMIDE HCL 10 MG
10 TABLET ORAL ONCE
Refills: 0 | Status: COMPLETED | OUTPATIENT
Start: 2020-02-11 | End: 2020-02-11

## 2020-02-11 RX ADMIN — Medication 10 MILLIGRAM(S): at 14:42

## 2020-02-11 RX ADMIN — Medication 104 MILLIGRAM(S): at 12:51

## 2020-02-11 RX ADMIN — Medication 10 MILLIGRAM(S): at 13:15

## 2020-02-11 RX ADMIN — Medication 102 MILLIGRAM(S): at 13:23

## 2020-02-11 RX ADMIN — Medication 25 MILLIGRAM(S): at 12:51

## 2020-02-11 NOTE — ED PROVIDER NOTE - NSFOLLOWUPINSTRUCTIONS_ED_ALL_ED_FT
Rest, drink plenty of fluids  Advance activity as tolerated  Continue all previously prescribed medications as directed  Follow up with your PMD 2-3 days- bring copies of your results  Return to the ER for worsening  WHAT YOU NEED TO KNOW:    Tension headaches are most often caused by stress, eye strain, or muscle tightness. The pain of a tension headache may start in the forehead or the back of the head. The pain often spreads over the whole head and down into the neck and shoulders. Over-the-counter pain medicine is the most useful and common treatment for a tension headache. Exercise, biofeedback, meditation, or relaxation techniques may also decrease your headache pain.    DISCHARGE INSTRUCTIONS:    Call your local emergency number (911 in the [place]) if:     You have difficulty seeing, speaking, or moving.       You have a seizure.    Call your doctor if:     You pass out or become confused.      You have a sudden headache that seems different or much worse than your usual headaches.      You have a headache, fever, and a stiff neck.      Your headaches continue to get worse.      Your headaches happen so often that they affect your ability to do your work or normal activities.      You need to take medicine to help your headaches more often than your healthcare provider says you should.      Your headaches get so bad that they cause you to vomit.       You have questions or concerns about your condition or care.    Medicines:     NSAIDs, such as ibuprofen, help decrease swelling, pain, and fever. This medicine is available with or without a doctor's order. NSAIDs can cause stomach bleeding or kidney problems in certain people. If you take blood thinner medicine, always ask your healthcare provider if NSAIDs are safe for you. Always read the medicine label and follow directions.      Acetaminophen decreases pain and fever. It is available without a doctor's order. Ask how much to take and how often to take it. Follow directions. Read the labels of all other medicines you are using to see if they also contain acetaminophen, or ask your doctor or pharmacist. Acetaminophen can cause liver damage if not taken correctly. Do not use more than 4 grams (4,000 milligrams) total of acetaminophen in one day.       Take your medicine as directed. Contact your healthcare provider if you think your medicine is not helping or if you have side effects. Tell him or her if you are allergic to any medicine. Keep a list of the medicines, vitamins, and herbs you take. Include the amounts, and when and why you take them. Bring the list or the pill bottles to follow-up visits. Carry your medicine list with you in case of an emergency.    Manage your symptoms:     Keep a headache record. Include when the headaches start and stop and what made them better. Describe your symptoms, such as how the pain feels, where it is, and how bad it is. Record anything you ate or drank for the past 24 hours before your headache. Bring this to follow-up visits.      Apply heat as directed. Heat may help decrease headache pain and muscle spasms. Apply heat on the area for 20 to 30 minutes every 2 hours for as many days as directed. A warm bath may also help relieve muscle tension and spasms.      Apply ice as directed. Ice may help decrease headache pain. Use an ice pack or put crushed ice in a plastic bag. Cover it with a towel and place it on the area for 15 to 20 minutes every hour as directed.      Ask about spinal manipulation. This treatment combines movement, massage, exercise, and physical therapy. It can help relieve a tension headache, and may be able to prevent another if done regularly. Talk to your healthcare provider to make sure it is safe for you. To prevent neck injuries, spinal manipulation should only be done by trained providers.    Prevent tension headaches:     Avoid muscle tension. Do not stay in one position for long periods of time. Use a different pillow if you wake up with sore neck and shoulder muscles. Find ways to relax your muscles, such as massage or resting in a quiet, dark room.       Avoid eye strain. Make sure you have good lighting when you read, sew, or do similar activities. Get yearly eye exams and wear glasses as directed.      Get enough sleep. Get 8 to 10 hours of sleep each night. Create a sleep schedule. Go to bed and wake up at the same times each day. It may be helpful to do something relaxing before bed. Do not watch television right before bed.      Eat a variety of healthy foods. Healthy foods include fruits, vegetables, whole-grain breads, low-fat dairy products, beans, lean meats, and fish. Do not eat foods that trigger your headaches.      Exercise regularly. Exercise helps decrease stress and headaches. Ask about the best exercise plan for you.      Drink liquids as directed. You may need to drink more liquid to prevent dehydration. Dehydration can make a tension headache worse. Ask your healthcare provider how much liquid to drink and which liquids are best for you. Limit caffeine as directed. Caffeine may make a tension headache worse.      Do not drink alcohol. Alcohol can trigger a headache. It can also prevent medicines from stopping your headache.      Do not smoke. Nicotine and other chemicals in cigarettes and cigars can trigger a headache and also cause lung damage. Ask your healthcare provider for information if you currently smoke and need help to quit. E-cigarettes or smokeless tobacco still contain nicotine. Talk to your healthcare provider before you use these products.     Follow up with your healthcare provider as directed: Bring the headache record with you. Write down your questions so you remember to ask them during your visits.       © Copyright [employer] 2020       back to top                      © Copyright [employer] 2020

## 2020-02-11 NOTE — ED ADULT NURSE NOTE - NSIMPLEMENTINTERV_GEN_ALL_ED
Implemented All Universal Safety Interventions:  [place] to call system. Call bell, personal items and telephone within reach. Instruct patient to call for assistance. Room bathroom lighting operational. Non-slip footwear when patient is off stretcher. Physically safe environment: no spills, clutter or unnecessary equipment. Stretcher in lowest position, wheels locked, appropriate side rails in place.

## 2020-02-11 NOTE — ED PROVIDER NOTE - CHPI ED SYMPTOMS NEG
no weakness/no vomiting/no fever/no numbness/no loss of consciousness/no blurred vision/no confusion/no change in level of consciousness

## 2020-02-11 NOTE — ED ADULT TRIAGE NOTE - CHIEF COMPLAINT QUOTE
Per EMS " She started seeing brightness from her left eye and feeling a pressure headache about 20-30 minutes ago "

## 2020-02-11 NOTE — ED PROVIDER NOTE - PATIENT PORTAL LINK FT
You can access the FollowMyHealth Patient Portal offered by [facility] by registering at the following website: [email or web address]. By joining [employer]’s FollowMyHealth portal, you will also be able to view your health information using other applications (apps) compatible with our system.

## 2020-02-11 NOTE — ED PROVIDER NOTE - CLINICAL SUMMARY MEDICAL DECISION MAKING FREE TEXT BOX
L sided headache with photophobia , nausea , diziness , phonophobia similar to headache last time only difference flashing light L eye vision 20/40 RE, 20/20 L eye affected improved with reglan , decadron

## 2020-02-13 NOTE — DISCHARGE NOTE OB - CARE PROVIDER_API CALL
Jourdan Miranda), Obstetrics and Gynecology  [address]  [address]  [place] [address]  Phone: [phone]  Fax: [phone]
austistic/developmental considerations

## 2020-02-25 ENCOUNTER — RESULT REVIEW (OUTPATIENT)
Age: 40
End: 2020-02-25

## 2020-03-02 ENCOUNTER — APPOINTMENT (OUTPATIENT)
Dept: MAMMOGRAPHY | Facility: HOSPITAL | Age: 40
End: 2020-03-02
Payer: COMMERCIAL

## 2020-03-02 ENCOUNTER — OUTPATIENT (OUTPATIENT)
Dept: OUTPATIENT SERVICES | Facility: HOSPITAL | Age: 40
LOS: 1 days | End: 2020-03-02
Payer: COMMERCIAL

## 2020-03-02 ENCOUNTER — APPOINTMENT (OUTPATIENT)
Dept: ULTRASOUND IMAGING | Facility: HOSPITAL | Age: 40
End: 2020-03-02
Payer: COMMERCIAL

## 2020-03-02 DIAGNOSIS — Z00.8 ENCOUNTER FOR OTHER GENERAL EXAMINATION: ICD-10-CM

## 2020-03-02 PROCEDURE — 77067 SCR MAMMO BI INCL CAD: CPT

## 2020-03-02 PROCEDURE — 76536 US EXAM OF HEAD AND NECK: CPT

## 2020-03-02 PROCEDURE — 76536 US EXAM OF HEAD AND NECK: CPT | Mod: 26

## 2020-03-02 PROCEDURE — 77063 BREAST TOMOSYNTHESIS BI: CPT | Mod: 26

## 2020-03-02 PROCEDURE — 77063 BREAST TOMOSYNTHESIS BI: CPT

## 2020-03-02 PROCEDURE — 77067 SCR MAMMO BI INCL CAD: CPT | Mod: 26

## 2020-03-31 ENCOUNTER — APPOINTMENT (OUTPATIENT)
Dept: ULTRASOUND IMAGING | Facility: HOSPITAL | Age: 40
End: 2020-03-31

## 2020-08-13 ENCOUNTER — EMERGENCY (EMERGENCY)
Facility: HOSPITAL | Age: 40
LOS: 1 days | Discharge: HOME | End: 2020-08-13
Attending: EMERGENCY MEDICINE | Admitting: EMERGENCY MEDICINE
Payer: COMMERCIAL

## 2020-08-13 VITALS
WEIGHT: 164.91 LBS | SYSTOLIC BLOOD PRESSURE: 144 MMHG | HEART RATE: 75 BPM | TEMPERATURE: 99 F | RESPIRATION RATE: 16 BRPM | DIASTOLIC BLOOD PRESSURE: 89 MMHG | OXYGEN SATURATION: 100 % | HEIGHT: 64 IN

## 2020-08-13 VITALS
HEART RATE: 74 BPM | SYSTOLIC BLOOD PRESSURE: 129 MMHG | RESPIRATION RATE: 16 BRPM | OXYGEN SATURATION: 100 % | DIASTOLIC BLOOD PRESSURE: 82 MMHG

## 2020-08-13 DIAGNOSIS — M25.569 PAIN IN UNSPECIFIED KNEE: ICD-10-CM

## 2020-08-13 PROCEDURE — 99284 EMERGENCY DEPT VISIT MOD MDM: CPT

## 2020-08-13 PROCEDURE — 73564 X-RAY EXAM KNEE 4 OR MORE: CPT | Mod: 26,RT

## 2020-08-13 PROCEDURE — 73564 X-RAY EXAM KNEE 4 OR MORE: CPT

## 2020-08-13 PROCEDURE — 73590 X-RAY EXAM OF LOWER LEG: CPT

## 2020-08-13 PROCEDURE — 73590 X-RAY EXAM OF LOWER LEG: CPT | Mod: 26,RT

## 2020-08-13 RX ORDER — IBUPROFEN 200 MG
1 TABLET ORAL
Qty: 21 | Refills: 0
Start: 2020-08-13 | End: 2020-08-19

## 2020-08-13 RX ORDER — OXYCODONE AND ACETAMINOPHEN 5; 325 MG/1; MG/1
1 TABLET ORAL ONCE
Refills: 0 | Status: DISCONTINUED | OUTPATIENT
Start: 2020-08-13 | End: 2020-08-13

## 2020-08-13 RX ADMIN — OXYCODONE AND ACETAMINOPHEN 1 TABLET(S): 5; 325 TABLET ORAL at 18:32

## 2020-08-13 RX ADMIN — OXYCODONE AND ACETAMINOPHEN 1 TABLET(S): 5; 325 TABLET ORAL at 18:02

## 2020-08-13 NOTE — ED ADULT NURSE NOTE - OBJECTIVE STATEMENT
40 yr old female to ED from home for evaluation of knee pain. Pt states she fell on a boat yesterday and her right knee hurts more than the left. Pt denies LOC, + sensation, + strong pulses, skin warm/dry.

## 2020-08-13 NOTE — ED PROVIDER NOTE - CONSTITUTIONAL, MLM
normal... Well appearing, awake, alert, oriented to person, place, time/situation and in mild distress due to pain

## 2020-08-13 NOTE — ED PROVIDER NOTE - CARE PROVIDER_API CALL
Arthur West  ORTHOPAEDIC SPORTS MEDICINE  [address] [place] [address]  Phone: [phone]  Fax: [phone]  Follow Up Time:

## 2020-08-13 NOTE — ED PROVIDER NOTE - ATTENDING CONTRIBUTION TO CARE
Dr. Sharma: I performed a face to face bedside interview with patient regarding history of present illness, review of symptoms and past medical history. I completed an independent physical exam.  I have discussed patient's plan of care with PA.   I agree with note as stated above, having amended the EMR as needed to reflect my findings.   This includes HISTORY OF PRESENT ILLNESS, HIV, PAST MEDICAL/SURGICAL/FAMILY/SOCIAL HISTORY, ALLERGIES AND HOME MEDICATIONS, REVIEW OF SYSTEMS, PHYSICAL EXAM, and any PROGRESS NOTES during the time I functioned as the attending physician for this patient.    Dr. Sharma: This H&P has been written by myself in its entirety

## 2020-08-13 NOTE — ED PROVIDER NOTE - NSFOLLOWUPINSTRUCTIONS_ED_ALL_ED_FT
Follow up with the orthopedic specialist as discussed     Take the prescribed medication as directed for severe pain. For mild to moderate pain take ibuprofen 600mg every 8 hours. Apply ice to your knee to help with pain and swelling.     Stay hydrated    Return to the ER if your symptoms worsen or for any other medical emergencies  *********    Knee Sprain     A knee sprain is a stretch or tear in a knee ligament. Knee ligaments are bands of tissue that connect bones in the knee to each other.  Follow these instructions at home:  If you have a splint or brace:     Wear the splint or brace as told by your doctor. Remove it only as told by your doctor.Loosen the splint or brace if your toes tingle, get numb, or turn cold and blue.Keep the splint or brace clean.If the splint or brace is not waterproof:  Do not let it get wet.Cover it with a watertight covering when you take a bath or a shower.If you have a cast:     Do not stick anything inside the cast to scratch your skin.Check the skin around the cast every day. Tell your doctor about any concerns.You may put lotion on dry skin around the edges of the cast. Do not put lotion on the skin underneath the cast.Keep the cast clean.If the cast is not waterproof:  Do not let it get wet.Cover it with a watertight covering when you take a bath or a shower.Managing pain, stiffness, and swelling        Gently move your toes often to avoid stiffness and to lessen swelling.Raise (elevate) the injured area above the level of your heart while you are sitting or lying down.Take over-the-counter and prescription medicines only as told by your doctor.If directed, put ice on the injured area.  If you have a removable splint or brace, remove it as told by your doctor.Put ice in a plastic bag.Place a towel between your skin and the bag or between your cast and the bag.Leave the ice on for 20 minutes, 2–3 times a day.General instructions     Do exercises as told by your doctor.Keep all follow-up visits as told by your doctor. This is important.Contact a doctor if:  You have pain that gets worse.The cast, brace, or splint does not fit right.The cast, brace, or splint gets damaged.Get help right away if:  You cannot lean on your knee to stand or walk.You cannot move the injured area.You knee laila or you have pain after you walk only a few steps.You have very bad pain, swelling, or numbness below the cast, brace, or splint.Summary  A knee sprain is a stretch or tear in a band (ligament) that connects your knee bones to each other.You may need to wear a splint, brace, or cast to help your knee get better.Contact your doctor if you have very bad pain, swelling, or numbness, or if you cannot walk.This information is not intended to replace advice given to you by your health care provider. Make sure you discuss any questions you have with your health care provider.

## 2020-08-13 NOTE — ED ADULT NURSE NOTE - NSIMPLEMENTINTERV_GEN_ALL_ED
Implemented All Universal Safety Interventions:  [place] to call system. Call bell, personal items and telephone within reach. Instruct patient to call for assistance. Room bathroom lighting operational. Non-slip footwear when patient is off stretcher. Physically safe environment: no spills, clutter or unnecessary equipment. Stretcher in lowest position, wheels locked, appropriate side rails in place.

## 2020-08-13 NOTE — ED PROVIDER NOTE - OBJECTIVE STATEMENT
Dr. Sharma: 40F no PMHx, sustained a mechanical fall yesterday on a boat and landed on her right knee, unable to walk since then. Pain not alleviated with motrin so came in. No other injuries. Accompanied by her .

## 2020-08-13 NOTE — ED PROVIDER NOTE - NS ED ATTENDING STATEMENT MOD
Reason For Visit  Shavonne Medina is here today for a nurse visit for TB Placement. Current Meds   1. Aspirin 81 MG TABS; TAKE 1 TABLET DAILY; Therapy: (Recorded:12Nov2016) to Recorded   2. BuPROPion HCl ER (XL) 150 MG Oral Tablet Extended Release 24 Hour; TAKE 1   TABLET DAILY; Therapy: 13GPL2924 to [id number])  Requested for: 72EYN1628; Last   Rx:[id number] Ordered   3. Levothyroxine Sodium 150 MCG Oral Tablet; take one tablet by mouth one time daily; Therapy: 98LMY6543 to (Evaluate:79Lzg5685)  Requested for: 99QVQ6338; Last   Rx:[id number] Ordered   4. Levothyroxine Sodium 175 MCG Oral Tablet; one tablet daily 2 days a week the other   days use 150 mcg; Therapy: 70QZI0357 to (Last Rx:12Qrx4691)  Requested for: 03VQP6529 Ordered   5. Lidocaine 5 % External Patch; APPLY 1 PATCH TO THE AFFECTED AREA AND LEAVE IN   PLACE FOR 12 HOURS, THEN REMOVE AND LEAVE OFF FOR 12 HOURS; Therapy: 25BLD9580 to (Stacey Pedroza)  Requested for: 32WIJ4275; Last   Rx:[id number] Ordered   6. Naproxen 500 MG Oral Tablet; TAKE 1 TABLET 3 TIMES DAILY AS NEEDED; Therapy: 22QGT3054 to (Evaluate:45Jyh8467)  Requested for: 66Ogn6586; Last   Rx:96Vye1592 Ordered   7. Pantoprazole Sodium 20 MG Oral Tablet Delayed Release; TAKE ONE TABLET BY   MOUTH TWICE DAILY; Therapy: 76BDI2411 to (Evaluate:90Soc6001)  Requested for: 42Wbw4315; Last   Rx:[id number] Ordered   8. Senexon-S 8.6-50 MG Oral Tablet; TAKE ONE OR TWO TABLETS BY MOUTH DAILY TO   PREVENT CONSTIPATION; Therapy: 42UQV7825 to (Evaluate:86Rri1788)  Requested for: 32Hgl3392; Last   Rx:67Tfw5384 Ordered   9. Senna-Docusate Sodium 8.6-50 MG Oral Tablet; TAKE 1 OR 2 TABLETS DAILY TO   PREVENT CONSTIPATION; Therapy: 97UKO3280 to (Evaluate:21Xpl3547)  Requested for: 80TDT4959; Last   Rx:53Lis9824 Ordered   10. Simvastatin 40 MG Oral Tablet; take one tablet by mouth one time daily; Therapy: 82TMI0419 to (Evaluate:61Xdc8671)  Requested for: 60UER6705;  Last Rx: 01AFS4624 Ordered   11. Sucralfate 1 GM Oral Tablet; TAKE ONE TABLET BY MOUTH EVERY TWELVE HOURS; Therapy: 58Ohp4786 to (Kelsey Shabana)  Requested for: 13UDK9729; Last    TP:06CVH0578 Ordered   12. Venlafaxine HCl  MG Oral Capsule Extended Release 24 Hour; TAKE ONE    CAPSULE BY MOUTH ONE TIME DAILY; Therapy: 70VIS7936 to (Evaluate:20Roq0835)  Requested for: 55EMY8444; Last    Rx:[id number] Ordered    Allergies  Demerol TABS    Screening  TB Screening:   See scanned screening form in the patients chart. Screening questions and answeres reviewed by the Provider. Reason TB test is being done today? work     Have you ever had a positive TB skin test or TB blood test? No.   Have you had a sever reaction to a TB skin test? No.   Have you ever taken medication for tuberculosis? No.   What country were you born in[place]   Have you traveled or lived [address] [place] [address] in the last 2 years? No.   Have you been in contact with someone who has TB disease? No.   Have you ever used injection drugs? No.   Do you have HIV/AIDS? No.   Do you have any diseases that could affect your immunes system such as cancer, leukemia or other? No.   Do you have diabetes? No.   Do you have severe kidney disease? No.   Are you underweight or do you have a disease which affects how you absorb food and nutrients? No.   Have you had the BCG vaccine? No.   Do you take any prescription medications? Yes. Assessment   1. Encounter for PPD test (Z11.1)    Plan   1. PPD, tuberculin skin test; purified protein derivative solution, intradermal    Patient Instructions TB test done: 08/23/2018 09:30 AM   Return in 48-72 hours for TB reading. We are unable to do the reading if outside that timeframe and you will have to have the test placed again. Return Saturday 8/25/18 after 0930 but before Sunday 8/26/18 0930  . Signatures   Electronically signed by : ANDREW Lopez;  Aug 23 2018 11:03AM CST I have personally performed a face to face diagnostic evaluation on this patient. I have reviewed the ACP note and agree with the history, exam and plan of care, except as noted.

## 2020-08-13 NOTE — ED PROVIDER NOTE - PROGRESS NOTE DETAILS
OCTAVIO Fermin: I participated in the care/evaluation of this patient. xray images reviewed with Dr. Sharma-- no fx noted. given the amount of pain patient has and inability to bear weight, will ace wrap knee and place in knee immobilizer for possible ligament injury. patient stable for dc with ortho f/u

## 2020-08-13 NOTE — ED PROVIDER NOTE - PATIENT PORTAL LINK FT
You can access the FollowMyHealth Patient Portal offered by [facility] by registering at the following website: [email or web address]. By joining [facility]’s FollowMyHealth portal, you will also be able to view your health information using other applications (apps) compatible with our system.

## 2020-08-13 NOTE — ED PROCEDURE NOTE - ATTENDING CONTRIBUTION TO CARE
Dr. Sharma: I performed a face to face bedside interview with patient regarding history of present illness, review of symptoms and past medical history. I completed an independent physical exam.  I have discussed patient's plan of care with PA.   I agree with note as stated above, having amended the EMR as needed to reflect my findings.   This includes HISTORY OF PRESENT ILLNESS, HIV, PAST MEDICAL/SURGICAL/FAMILY/SOCIAL HISTORY, ALLERGIES AND HOME MEDICATIONS, REVIEW OF SYSTEMS, PHYSICAL EXAM, and any PROGRESS NOTES during the time I functioned as the attending physician for this patient.

## 2020-08-14 ENCOUNTER — APPOINTMENT (OUTPATIENT)
Dept: MRI IMAGING | Facility: CLINIC | Age: 40
End: 2020-08-14
Payer: COMMERCIAL

## 2020-08-14 ENCOUNTER — TRANSCRIPTION ENCOUNTER (OUTPATIENT)
Age: 40
End: 2020-08-14

## 2020-08-14 ENCOUNTER — OUTPATIENT (OUTPATIENT)
Dept: OUTPATIENT SERVICES | Facility: HOSPITAL | Age: 40
LOS: 1 days | End: 2020-08-14
Payer: COMMERCIAL

## 2020-08-14 ENCOUNTER — APPOINTMENT (OUTPATIENT)
Dept: ORTHOPEDIC SURGERY | Facility: CLINIC | Age: 40
End: 2020-08-14
Payer: COMMERCIAL

## 2020-08-14 VITALS — WEIGHT: 160 LBS | HEIGHT: 64 IN | BODY MASS INDEX: 27.31 KG/M2

## 2020-08-14 DIAGNOSIS — M25.561 PAIN IN RIGHT KNEE: ICD-10-CM

## 2020-08-14 PROCEDURE — 73721 MRI JNT OF LWR EXTRE W/O DYE: CPT

## 2020-08-14 PROCEDURE — 73721 MRI JNT OF LWR EXTRE W/O DYE: CPT | Mod: 26,RT

## 2020-08-14 PROCEDURE — 99203 OFFICE O/P NEW LOW 30 MIN: CPT

## 2020-08-14 NOTE — DISCUSSION/SUMMARY
[de-identified] : The underlying pathophysiology was reviewed in great detail with the patient as well as the various treatment options, including ice, analgesics, NSAIDs, Physical therapy, steroid injections.\par \par A prescription was provided for a MRI of the right knee to rule out MCL tear verus meniscus tear.  \par \par A hinge knee brace was provided in clinic today.\par \par Activity modifications and restrictions were discussed. May continue using crutches as needed. \par \par FU once imaging is obtained. \par \par All questions were answered, all alternatives discussed and the patient is in complete agreement with that plan. Follow-up appointment as instructed. Any issues and the patient will call or come in sooner.

## 2020-08-14 NOTE — HISTORY OF PRESENT ILLNESS
[de-identified] : DENISE BURNETT is a 40 year female presenting to the office complaining of right knee pain. She  presents to the office ambulating with crutches in a knee immobilizer. Patient reports pain began on 08/13/2020 while tubing. Patient reports getting thrown off the tube so high and she landed back in the tub.. She went to [place] ED at the time where she had xrays of the right knee and tib/fib negative for acute fracture/dislocation.  The patient describes the pain as a dull aching, and occasionally sharp pain localized to the medial aspect of her right knee that is intermittent in nature. Her  symptoms are exacerbated with weightbearing on the leg, walking and bending the knee. Pain is alleviated with rest. Patient is taking NSAIDs, Ice for pain relief with mild relief in symptoms. Patient denies any other complaints at this time.

## 2020-08-14 NOTE — PHYSICAL EXAM
[de-identified] : Right Lower Extremity\par o Knee :\par ¦ Inspection/Palpation : marked medal joint line, and medial epicondyle tenderness to palpation, no joint line tenderness to palpation.  trace effusion, no deformity\par ¦ Range of Motion : 0 - 35 degrees, marked pain with ROM,  no crepitus\par ¦ Stability : pain with valgus stress, no laxity, or varus instability present on provocative testing, Lachman’s Test (-)\par ¦ Strength : not assessed due to pain.\par o Muscle Bulk : normal muscle bulk present\par o Skin : no erythema, no ecchymosis\par o Sensation : sensation to pin intact\par o Vascular Exam : no edema, no cyanosis, dorsalis pedis artery pulse 2+, posterior tibial artery pulse 2+\par \par Left Lower Extremity\par o Knee :\par ¦ Inspection/Palpation : no tenderness to palpation, no swelling, no deformity\par ¦ Range of Motion : 0 -120 degrees, no crepitus\par ¦ Stability : no valgus or varus instability present on provocative testing, Lachman’s Test (-)\par ¦ Strength : flexion and extension 5/5\par o Muscle Bulk : normal muscle bulk present\par o Skin : no erythema, no ecchymosis\par o Sensation : sensation to pin intact\par o Vascular Exam : no edema, no cyanosis, dorsalis pedis artery pulse 2+, posterior tibial artery pulse 2+ [de-identified] : o Xray of the right knee and tib/fib performed on 08/13/2020 at [facility]: Impression:\par ¦  Four views of the right knee show no evidence of fracture nor destructive change. Eggshell calcification in the proximal tibia has benign features. It also may indicate postoperative change. The joint spaces are maintained. \par ¦ Note - This does not exclude fractures in perfect alignment and apposition as presence may be indicated at one to three weeks following callus formation.

## 2020-08-24 DIAGNOSIS — S83.411A SPRAIN OF MEDIAL COLLATERAL LIGAMENT OF RIGHT KNEE, INITIAL ENCOUNTER: ICD-10-CM

## 2020-09-01 ENCOUNTER — APPOINTMENT (OUTPATIENT)
Dept: ORTHOPEDIC SURGERY | Facility: CLINIC | Age: 40
End: 2020-09-01

## 2020-09-01 NOTE — DISCUSSION/SUMMARY
[de-identified] : The underlying pathophysiology was reviewed in great detail with the patient as well as the various treatment options, including ice, analgesics, NSAIDs, Physical therapy, steroid injections.\par \par A prescription was provided for a MRI of the right knee to rule out MCL tear verus meniscus tear.  \par \par A hinge knee brace was provided in clinic today.\par \par Activity modifications and restrictions were discussed. May continue using crutches as needed. \par \par FU once imaging is obtained. \par \par All questions were answered, all alternatives discussed and the patient is in complete agreement with that plan. Follow-up appointment as instructed. Any issues and the patient will call or come in sooner.

## 2020-09-01 NOTE — PHYSICAL EXAM
[de-identified] : Right Lower Extremity\par o Knee :\par ¦ Inspection/Palpation : marked medal joint line, and medial epicondyle tenderness to palpation, no joint line tenderness to palpation.  trace effusion, no deformity\par ¦ Range of Motion : 0 - 35 degrees, marked pain with ROM,  no crepitus\par ¦ Stability : pain with valgus stress, no laxity, or varus instability present on provocative testing, Lachman’s Test (-)\par ¦ Strength : not assessed due to pain.\par o Muscle Bulk : normal muscle bulk present\par o Skin : no erythema, no ecchymosis\par o Sensation : sensation to pin intact\par o Vascular Exam : no edema, no cyanosis, dorsalis pedis artery pulse 2+, posterior tibial artery pulse 2+\par \par Left Lower Extremity\par o Knee :\par ¦ Inspection/Palpation : no tenderness to palpation, no swelling, no deformity\par ¦ Range of Motion : 0 -120 degrees, no crepitus\par ¦ Stability : no valgus or varus instability present on provocative testing, Lachman’s Test (-)\par ¦ Strength : flexion and extension 5/5\par o Muscle Bulk : normal muscle bulk present\par o Skin : no erythema, no ecchymosis\par o Sensation : sensation to pin intact\par o Vascular Exam : no edema, no cyanosis, dorsalis pedis artery pulse 2+, posterior tibial artery pulse 2+ [de-identified] : o MRI of the right knee performed on 08/14/2020 at [facility]: Impression: \par 1. Grade 2 MCL sprain mild partial tearing of the anterior proximal fibers. \par 2. Menisci appear intact. \par 3. Small effusion with mild presumably reactive synovitis. \par \par \par

## 2020-09-01 NOTE — HISTORY OF PRESENT ILLNESS
[de-identified] : DENISE BURNETT is a 40 year female presenting to the office complaining of right knee pain. She  presents to the office ambulating with crutches in a knee immobilizer. Patient reports pain began on 08/13/2020 while tubing. Patient reports getting thrown off the tube so high and she landed back in the tub.. She went to [place] ED at the time where she had xrays of the right knee and tib/fib negative for acute fracture/dislocation.  The patient describes the pain as a dull aching, and occasionally sharp pain localized to the medial aspect of her right knee that is intermittent in nature. Her  symptoms are exacerbated with weightbearing on the leg, walking and bending the knee. Pain is alleviated with rest. Patient is taking NSAIDs, Ice for pain relief with mild relief in symptoms. Patient denies any other complaints at this time.

## 2020-12-10 NOTE — ED PROVIDER NOTE - NSTIMEPROVIDERCAREINITIATE_GEN_ER
"VENTRAL/INCISIONAL HERNIA REPAIR LAPAROSCOPIC  Progress Note    Jamison Neriland  12/10/2020    Pre-op Diagnosis:   Incisional hernia       Post-Op Diagnosis Codes:  Same    Procedure/CPT® Codes:        Procedure(s):  INCISIONAL HERNIA REPAIR LAPAROSCOPIC WITH MESH    Surgeon(s):  Herbert Umanzor MD King, Robert J, MD    Anesthesia: General with Block    Staff:   Circulator: Pauline Walton RN  Scrub Person: Franco Teixeira RN  Nursing Assistant: Rachel Finn; Maureen Garcia PCT         Estimated Blood Loss: minimal    Urine Voided: * No values recorded between 12/10/2020  7:49 AM and 12/10/2020  9:29 AM *    Specimens:                None          Drains: * No LDAs found *    Findings:   1.  16 x 5 cm \"swiss cheese\" incisional hernia completely repaired with a 22 x 12 cm piece of Ventralight ST mesh in a retrofascial location.    Complications: None          Herbert Umanzor MD     Date: 12/10/2020  Time: 09:29 EST      " 30-Mar-2019 04:31

## 2021-02-05 ENCOUNTER — EMERGENCY (EMERGENCY)
Facility: HOSPITAL | Age: 41
LOS: 1 days | Discharge: HOME | End: 2021-02-05
Attending: EMERGENCY MEDICINE | Admitting: EMERGENCY MEDICINE
Payer: COMMERCIAL

## 2021-02-05 VITALS
WEIGHT: 169.98 LBS | SYSTOLIC BLOOD PRESSURE: 145 MMHG | TEMPERATURE: 99 F | HEART RATE: 85 BPM | DIASTOLIC BLOOD PRESSURE: 86 MMHG | RESPIRATION RATE: 16 BRPM | OXYGEN SATURATION: 97 % | HEIGHT: 64 IN

## 2021-02-05 PROCEDURE — 99283 EMERGENCY DEPT VISIT LOW MDM: CPT

## 2021-02-05 RX ORDER — IBUPROFEN 200 MG
600 TABLET ORAL ONCE
Refills: 0 | Status: COMPLETED | OUTPATIENT
Start: 2021-02-05 | End: 2021-02-05

## 2021-02-05 RX ORDER — POLYMYXIN B SULF/TRIMETHOPRIM 10000-1/ML
1 DROPS OPHTHALMIC (EYE) ONCE
Refills: 0 | Status: COMPLETED | OUTPATIENT
Start: 2021-02-05 | End: 2021-02-05

## 2021-02-05 RX ORDER — POLYMYXIN B SULF/TRIMETHOPRIM 10000-1/ML
1 DROPS OPHTHALMIC (EYE)
Qty: 1 | Refills: 0
Start: 2021-02-05 | End: 2021-02-11

## 2021-02-05 RX ADMIN — Medication 1 DROP(S): at 20:48

## 2021-02-05 NOTE — ED ADULT TRIAGE NOTE - CHIEF COMPLAINT QUOTE
Left eye pain x 2 days, patient states feels like something is scratching her eye, thinks there may be foreign body present.

## 2021-02-05 NOTE — ED PROVIDER NOTE - NSFOLLOWUPINSTRUCTIONS_ED_ALL_ED_FT
Follow up with Opthalmology within 1-2 days- referral above or you can call: Find a Physician helpline ([phone]) for assistance   Polytrim eye drops- place 1 drop in the right eye every 4 hours for 7 days.   Take Motrin 400mg every 6hrs with food as needed for pain.   Cold compresses to eye for pain.  DO NOT RUB THE EYE!  Worsening, continued or ANY new concerning symptoms return to the emergency department.    Corneal Abrasion     A corneal abrasion is a scratch or injury to the clear covering over the front of your eye (cornea). Your cornea forms a clear dome that protects your eye and helps to focus your vision. Your cornea is made up of many layers. The surface layer is a single layer of cells (corneal epithelium). It is one of the most sensitive tissues in your body. A corneal abrasion can be very painful.  If a corneal abrasion is not treated, it can become infected and cause an ulcer. This can lead to scarring. A scarred cornea can affect your vision. Sometimes abrasions come back in the same area, even after the original injury has healed (recurrent erosion syndrome).  What are the causes?  This condition may be caused by:  A poke in the eye.A gritty or irritating substance (foreign body) in the eye.Excessive eye rubbing.Very dry eyes.Certain eye infections.Contact lenses that fit poorly or are worn for a long period of time. You can also injure your cornea when putting contacts lenses in your eye or taking them out.Eye surgery.Sometimes, the cause is unknown.  What are the signs or symptoms?  Symptoms of this condition include:  Eye pain. The pain may get worse when your eye is open or when you move your eye.A feeling of something stuck in your eye.Having trouble keeping your eye open, or not being able to keep it open.Tearing and redness.Sensitivity to light.Blurred vision.Headache.How is this diagnosed?  This condition may be diagnosed based on:  Your medical history.Your symptoms.An eye exam. You may work with a health care provider who specializes in diseases and conditions of the eye (ophthalmologist). Before the eye exam, numbing drops may be put into your eye. You may also have dye put in your eye with a dropper or a small paper strip. The dye makes the abrasion easy to see when your ophthalmologist examines your eye with a light. Your ophthalmologist may look at your eye through an eye scope (slit lamp).How is this treated?  Treatment may vary depending on the cause of your condition, and it may include:  Washing out your eye.Removing any foreign body.Antibiotic drops or ointment to treat an infection.Steroid drops or ointment to treat redness, irritation, or inflammation.Pain medicine.An eye patch to keep your eye closed.Follow these instructions at home:  Medicines     Use eye drops or ointments as told by your eye care provider.If you were prescribed antibiotic drops or ointment, use them as told by your eye care provider. Do not stop using the antibiotic even if you start to feel better.Take over-the-counter and prescription medicines only as told by your eye care provider.Do not drive or use heavy machinery while taking prescription pain medicine.General instructions     If you have an eye patch, wear it as told by your eye care provider.  Do not drive or use machinery while wearing an eye patch. Your ability to  distances will be impaired.Follow instructions from your eye care provider about when to remove the patch.Ask your eye care provider whether you can use a cold, wet cloth (compress) on your eye to relieve pain.Do not rub or touch your eye. Do not wash out your eye.Do not wear contact lenses until your eye care provider says that this is okay.Avoid bright light and eye strain.Keep all follow-up visits as told by your eye care provider. This is important for preventing infection and vision loss.Contact a health care provider if:  You continue to have eye pain and other symptoms for more than 2 days.You develop new symptoms, such as redness, tearing, or discharge.You have discharge that makes your eyelids stick together in the morning.Your eye patch becomes so loose that you can blink your eye.Symptoms return after the original abrasion has healed.Get help right away if:  You have severe eye pain that does not get better with medicine.You have vision loss.Summary  A corneal abrasion is a scratch on the outer layer of the clear covering over the front of your eye (cornea).Corneal abrasion can cause eye pain, redness, tearing, and blurred vision.This condition is usually treated with medicine to prevent infection and scarring. You also may have to wear an eye patch to cover your eye.Let your eye care provider know if your symptoms continue for more than 2 days.

## 2021-02-05 NOTE — ED PROVIDER NOTE - CARE PROVIDER_API CALL
Darrick Reed  OPHTHALMOLOGY  [address] [place] [phone]  Phone: [phone]  Fax: [phone]  Follow Up Time:

## 2021-02-05 NOTE — ED PROVIDER NOTE - PATIENT PORTAL LINK FT
You can access the FollowMyHealth Patient Portal offered by [facility] by registering at the following website: [email or web address]. By joining [facility] [employer] [facility] [employer]’s FollowMyHealth portal, you will also be able to view your health information using other applications (apps) compatible with our system.

## 2021-02-05 NOTE — ED PROVIDER NOTE - CLINICAL SUMMARY MEDICAL DECISION MAKING FREE TEXT BOX
Left eye irritation likely corneal abrasion- check visual acuity, woods lamp exam with fluorescein stain, ABX drops, pain control, Optho follow up

## 2021-02-05 NOTE — ED PROVIDER NOTE - ATTENDING CONTRIBUTION TO CARE
pt c/o L eye FB sensatino and irritation x 2 days, gradual, worsening. no discharge. no blurry vision. no headache, n/v. no photophobia. nocontact use    exam:   General: well appearing, NAD.   HEENT: eyes perrl, R eye normal. L eye: no FB, lid everted. ant chamber clear, EOMI. fluorescein exam: slight ill defined haze/uptake ~2oclock position periphery of cornea.  focal uptake x 3 spots at 7 oclock position over sclera.  cor: RRR, s1s2, 2+rad pulses.   lungs: ctabl, no resp distress.   neuro: a&ox3, cn2-12 intact, PLATA, 5/5 strength c nl sensation all extremities, nl coordination.     AP: L eye irritation 2 days. no FB. +conjunctival abrasion. ?mild corneal abrasion. topical abx. motrin for pain. f/u ophthal

## 2021-03-18 ENCOUNTER — RESULT REVIEW (OUTPATIENT)
Age: 41
End: 2021-03-18

## 2021-04-09 NOTE — PATIENT PROFILE OB - FALL HARM RISK CONCLUSION
Patient walked out , steady gait, refused to talk to staff, evaluated by MD, gave no reason for his elopement.
Universal Safety Interventions

## 2021-05-13 ENCOUNTER — APPOINTMENT (OUTPATIENT)
Dept: ULTRASOUND IMAGING | Facility: HOSPITAL | Age: 41
End: 2021-05-13
Payer: COMMERCIAL

## 2021-05-13 ENCOUNTER — APPOINTMENT (OUTPATIENT)
Dept: MAMMOGRAPHY | Facility: HOSPITAL | Age: 41
End: 2021-05-13
Payer: COMMERCIAL

## 2021-05-13 ENCOUNTER — OUTPATIENT (OUTPATIENT)
Dept: OUTPATIENT SERVICES | Facility: HOSPITAL | Age: 41
LOS: 1 days | End: 2021-05-13
Payer: COMMERCIAL

## 2021-05-13 DIAGNOSIS — Z00.8 ENCOUNTER FOR OTHER GENERAL EXAMINATION: ICD-10-CM

## 2021-05-13 PROCEDURE — 76641 ULTRASOUND BREAST COMPLETE: CPT | Mod: 26,50

## 2021-05-13 PROCEDURE — 76641 ULTRASOUND BREAST COMPLETE: CPT

## 2021-05-13 PROCEDURE — 77067 SCR MAMMO BI INCL CAD: CPT | Mod: 26

## 2021-05-13 PROCEDURE — 77063 BREAST TOMOSYNTHESIS BI: CPT

## 2021-05-13 PROCEDURE — 77063 BREAST TOMOSYNTHESIS BI: CPT | Mod: 26

## 2021-05-13 PROCEDURE — 77067 SCR MAMMO BI INCL CAD: CPT

## 2021-06-23 NOTE — ED ADULT NURSE NOTE - NS PRO AD BILL OF RIGHTS
Pt tolerated infusion well. Took po fluids and snacks from home. Pt refused AVS and D/C ambulatory at 1241.   Yes

## 2021-12-26 ENCOUNTER — EMERGENCY (EMERGENCY)
Facility: HOSPITAL | Age: 41
LOS: 1 days | Discharge: HOME | End: 2021-12-26
Attending: STUDENT IN AN ORGANIZED HEALTH CARE EDUCATION/TRAINING PROGRAM | Admitting: STUDENT IN AN ORGANIZED HEALTH CARE EDUCATION/TRAINING PROGRAM
Payer: COMMERCIAL

## 2021-12-26 VITALS
HEIGHT: 64 IN | HEART RATE: 79 BPM | RESPIRATION RATE: 16 BRPM | OXYGEN SATURATION: 98 % | TEMPERATURE: 99 F | DIASTOLIC BLOOD PRESSURE: 84 MMHG | WEIGHT: 164.91 LBS | SYSTOLIC BLOOD PRESSURE: 134 MMHG

## 2021-12-26 LAB
ALBUMIN SERPL ELPH-MCNC: 3.6 G/DL — SIGNIFICANT CHANGE UP (ref 3.3–5)
ALP SERPL-CCNC: 46 U/L — SIGNIFICANT CHANGE UP (ref 40–120)
ALT FLD-CCNC: 17 U/L — SIGNIFICANT CHANGE UP (ref 10–45)
ANION GAP SERPL CALC-SCNC: 7 MMOL/L — SIGNIFICANT CHANGE UP (ref 5–17)
AST SERPL-CCNC: 17 U/L — SIGNIFICANT CHANGE UP (ref 10–40)
BASOPHILS # BLD AUTO: 0.03 K/UL — SIGNIFICANT CHANGE UP (ref 0–0.2)
BASOPHILS NFR BLD AUTO: 0.5 % — SIGNIFICANT CHANGE UP (ref 0–2)
BILIRUB SERPL-MCNC: 0.4 MG/DL — SIGNIFICANT CHANGE UP (ref 0.2–1.2)
BUN SERPL-MCNC: 15 MG/DL — SIGNIFICANT CHANGE UP (ref 7–23)
CALCIUM SERPL-MCNC: 8.9 MG/DL — SIGNIFICANT CHANGE UP (ref 8.4–10.5)
CHLORIDE SERPL-SCNC: 107 MMOL/L — SIGNIFICANT CHANGE UP (ref 96–108)
CO2 SERPL-SCNC: 25 MMOL/L — SIGNIFICANT CHANGE UP (ref 22–31)
CREAT SERPL-MCNC: 0.77 MG/DL — SIGNIFICANT CHANGE UP (ref 0.5–1.3)
D DIMER BLD IA.RAPID-MCNC: <150 NG/ML DDU — SIGNIFICANT CHANGE UP
EOSINOPHIL # BLD AUTO: 0.02 K/UL — SIGNIFICANT CHANGE UP (ref 0–0.5)
EOSINOPHIL NFR BLD AUTO: 0.3 % — SIGNIFICANT CHANGE UP (ref 0–6)
GLUCOSE SERPL-MCNC: 97 MG/DL — SIGNIFICANT CHANGE UP (ref 70–99)
HCG SERPL-ACNC: <1 MIU/ML — SIGNIFICANT CHANGE UP
HCT VFR BLD CALC: 37.2 % — SIGNIFICANT CHANGE UP (ref 34.5–45)
HGB BLD-MCNC: 12.5 G/DL — SIGNIFICANT CHANGE UP (ref 11.5–15.5)
IMM GRANULOCYTES NFR BLD AUTO: 0.3 % — SIGNIFICANT CHANGE UP (ref 0–1.5)
LYMPHOCYTES # BLD AUTO: 1.24 K/UL — SIGNIFICANT CHANGE UP (ref 1–3.3)
LYMPHOCYTES # BLD AUTO: 21 % — SIGNIFICANT CHANGE UP (ref 13–44)
MCHC RBC-ENTMCNC: 29.3 PG — SIGNIFICANT CHANGE UP (ref 27–34)
MCHC RBC-ENTMCNC: 33.6 GM/DL — SIGNIFICANT CHANGE UP (ref 32–36)
MCV RBC AUTO: 87.3 FL — SIGNIFICANT CHANGE UP (ref 80–100)
MONOCYTES # BLD AUTO: 0.94 K/UL — HIGH (ref 0–0.9)
MONOCYTES NFR BLD AUTO: 15.9 % — HIGH (ref 2–14)
NEUTROPHILS # BLD AUTO: 3.66 K/UL — SIGNIFICANT CHANGE UP (ref 1.8–7.4)
NEUTROPHILS NFR BLD AUTO: 62 % — SIGNIFICANT CHANGE UP (ref 43–77)
NRBC # BLD: 0 /100 WBCS — SIGNIFICANT CHANGE UP (ref 0–0)
PLATELET # BLD AUTO: 193 K/UL — SIGNIFICANT CHANGE UP (ref 150–400)
POTASSIUM SERPL-MCNC: 4.2 MMOL/L — SIGNIFICANT CHANGE UP (ref 3.5–5.3)
POTASSIUM SERPL-SCNC: 4.2 MMOL/L — SIGNIFICANT CHANGE UP (ref 3.5–5.3)
PROT SERPL-MCNC: 8.1 G/DL — SIGNIFICANT CHANGE UP (ref 6–8.3)
RBC # BLD: 4.26 M/UL — SIGNIFICANT CHANGE UP (ref 3.8–5.2)
RBC # FLD: 12.8 % — SIGNIFICANT CHANGE UP (ref 10.3–14.5)
SARS-COV-2 RNA SPEC QL NAA+PROBE: DETECTED
SODIUM SERPL-SCNC: 139 MMOL/L — SIGNIFICANT CHANGE UP (ref 135–145)
TROPONIN I, HIGH SENSITIVITY RESULT: 4.1 NG/L — SIGNIFICANT CHANGE UP
WBC # BLD: 5.91 K/UL — SIGNIFICANT CHANGE UP (ref 3.8–10.5)
WBC # FLD AUTO: 5.91 K/UL — SIGNIFICANT CHANGE UP (ref 3.8–10.5)

## 2021-12-26 PROCEDURE — 84484 ASSAY OF TROPONIN QUANT: CPT

## 2021-12-26 PROCEDURE — 84702 CHORIONIC GONADOTROPIN TEST: CPT

## 2021-12-26 PROCEDURE — 85025 COMPLETE CBC W/AUTO DIFF WBC: CPT

## 2021-12-26 PROCEDURE — 36415 COLL VENOUS BLD VENIPUNCTURE: CPT

## 2021-12-26 PROCEDURE — 71045 X-RAY EXAM CHEST 1 VIEW: CPT | Mod: 26

## 2021-12-26 PROCEDURE — 93005 ELECTROCARDIOGRAM TRACING: CPT

## 2021-12-26 PROCEDURE — 80053 COMPREHEN METABOLIC PANEL: CPT

## 2021-12-26 PROCEDURE — 99285 EMERGENCY DEPT VISIT HI MDM: CPT

## 2021-12-26 PROCEDURE — 96360 HYDRATION IV INFUSION INIT: CPT

## 2021-12-26 PROCEDURE — 85379 FIBRIN DEGRADATION QUANT: CPT

## 2021-12-26 PROCEDURE — 93010 ELECTROCARDIOGRAM REPORT: CPT

## 2021-12-26 PROCEDURE — 87635 SARS-COV-2 COVID-19 AMP PRB: CPT

## 2021-12-26 PROCEDURE — 71045 X-RAY EXAM CHEST 1 VIEW: CPT

## 2021-12-26 PROCEDURE — 99283 EMERGENCY DEPT VISIT LOW MDM: CPT | Mod: 25

## 2021-12-26 RX ORDER — SODIUM CHLORIDE 9 MG/ML
1000 INJECTION INTRAMUSCULAR; INTRAVENOUS; SUBCUTANEOUS ONCE
Refills: 0 | Status: COMPLETED | OUTPATIENT
Start: 2021-12-26 | End: 2021-12-26

## 2021-12-26 RX ORDER — ACETAMINOPHEN 500 MG
650 TABLET ORAL ONCE
Refills: 0 | Status: COMPLETED | OUTPATIENT
Start: 2021-12-26 | End: 2021-12-26

## 2021-12-26 RX ORDER — LIDOCAINE 4 G/100G
1 CREAM TOPICAL ONCE
Refills: 0 | Status: COMPLETED | OUTPATIENT
Start: 2021-12-26 | End: 2021-12-26

## 2021-12-26 RX ADMIN — SODIUM CHLORIDE 1000 MILLILITER(S): 9 INJECTION INTRAMUSCULAR; INTRAVENOUS; SUBCUTANEOUS at 11:40

## 2021-12-26 RX ADMIN — LIDOCAINE 1 PATCH: 4 CREAM TOPICAL at 10:38

## 2021-12-26 RX ADMIN — Medication 650 MILLIGRAM(S): at 11:08

## 2021-12-26 RX ADMIN — Medication 650 MILLIGRAM(S): at 10:38

## 2021-12-26 RX ADMIN — SODIUM CHLORIDE 1000 MILLILITER(S): 9 INJECTION INTRAMUSCULAR; INTRAVENOUS; SUBCUTANEOUS at 10:38

## 2021-12-26 NOTE — ED ADULT NURSE NOTE - NSFALLRSKASSESSTYPE_ED_ALL_ED
How Severe Is Your Atopic Dermatitis?: moderate
Is This A New Presentation, Or A Follow-Up?: Follow Up Atopic Dermatitis
Additional History: 3 weeks ago, pt's family adopted a puppy (lab mix)
Initial (On Arrival)

## 2021-12-26 NOTE — ED ADULT NURSE NOTE - HIV OFFER
DATE OF ADMISSION:  06/05/2017



The patient is a very pleasant 84-year-old gentleman, recently moved 

to [place] from [place]. Much of the history is not available 

because of that. The patient has not seen a doctor apparently for more 

than 3 years. Patient's daughter who happens to be a nurse 

practitioner, when she brought the patient from [place] the 

patient was doing okay, ambulating with a cane, and did need a walker 

in the airport though. He was doing well until the day before, that is 

Saturday, and the patient had a fall on Saturday, felt weak all of a  

sudden and had a fall. Patient was complaining of back pain. Because 

of this I obtained a CT of the back. There were some chronic changes 

without any myelopathy or compression of the spinal cord. The patient 

has urinary incontinence which is more so lately. The patient has 

bowel incontinence as well and foul-smelling urine, because of which 

patient was brought to the ER and then patient had a fall. The urine 

is grossly abnormal. The patient apparently, 20 years ago, was treated 

for prostate cancer which is in complete remission, because of which 

PAC is being ordered. Initially there was a concern that he may have 

metastatic lesion to the back. The patient does not have any such 

lesions on the CT. The other issues are loss of weight slowly over 

time and the patient has not been eating well. Patient appears to have 

dementia, appears to have moderate dementia, we will obtain 

Mini-Mental status exam. Because of dementia the patient has not been 

eating and patient appears to be depressed secondary to that, because 

of which him I will start him on Donepezil and also antidepressant 

medications. Urine protein is 1+, small blood, large leukocyte 

esterase, RBC 10, WBC greater than 182. Many WBC clumps. The patient 

did have leukocytosis without any fever. Patient has increased urinary 

frequency as well. Although patient denies any symptoms, patient is 

not a reliable historian.  



REVIEW OF SYSTEMS: 

CONSTITUTIONAL: No fever, no malaise, no fatigue. 

HEENT: No recent visual problems or hearing problems. Denied any sore 

throat.  

CARDIOVASCULAR: No chest pain, orthopnea, PND, no palpitations, no 

syncope.  

PULMONARY: No shortness of breath, no cough, no hemoptysis. 

GASTROINTESTINAL: No diarrhea, no nausea, no vomiting, no abdominal 

pain. Normoactive bowel sounds.  

NEUROLOGICAL: No headaches, no weakness, no numbness. 

HEMATOLOGICAL: Denies any bleeding or petechiae. 

GENITOURINARY: Denies any burning micturition, frequency, or urgency. 

MUSCULOSKELETAL/RHEUMATOLOGICAL: Denies any joint pain, swelling, or 

any muscle pain.  

ENDOCRINE: Denies any polyuria or polydipsia. 

The rest of the 14 point review of systems is negative. 



Home medications: 

1. Fluticasone. 

2. Salmeterol. 

3. Albuterol ipratropium. 



ALLERGIES: No known drug allergies. 



PAST MEDICAL HISTORY: Significant for asthma, back surgery, 

possibility of dementia moderate dementia, can be senile dementia or 

vascular dementia.  



SOCIAL HISTORY: Denied any smoking, alcohol abuse or any drug abuse. 



FAMILY HISTORY: Significant for myocardial infarction. 



PHYSICAL EXAMINATION: 

VITAL SIGNS: Temperature 98.0, pulse of 69, respiratory rate of 16, 

blood pressure 123/67. Saturating at 96% on room air.  

GENERAL: Alert and oriented x around 2, which is most probably his 

baseline, which is unknown at this time.  

HEENT: Pupils are round and equally reacting to light. EOMI. No 

scleral icterus. No conjunctival pallor. Normocephalic, atraumatic. No 

pharyngeal erythema. No thyromegaly.  

CARDIOVASCULAR: S1 and S2 present. No murmurs, rubs, or gallops. 

PULMONARY: Chest is clear to auscultation, no wheezing or crackles. 

ABDOMEN: Soft, nontender, nondistended, normoactive bowel sounds. No 

palpable organomegaly.  

MUSCULOSKELETAL: No joint swelling or deformity. 

EXTREMITIES: No cyanosis, clubbing, or pedal edema. 

NEUROLOGICAL: Gross neurological examination did not reveal any focal 

deficits.  

SKIN: No rashes. 



LABORATORY DATA: CBC, CMP abnormal for elevated WBC count of 11,500. 

The patient's MCV is 90.7, INR is 1.4 consistent with nutritional 

deficiency of vitamin K.  



ASSESSMENT: 

1. Increased urinary frequency with abnormal urine. Urine cultures are 

obtained. The patient most probably has urinary tract infection for 

which patient is on Rocephin at this point of time.  

2. Generalized weakness and falls, probably related to urinary tract 

infection, which is expected to improve. 

3. Lumbar spinal CT did show some chronic changes without myelopathy 

or compression on the spinal cord at this time.  

4. Generalized weakness and deconditioning for which we will obtain 

physical therapy consult. PT and OT consultation.  

5. Possible dementia; appears to be moderate. We will obtain 

Mini-Mental status exam. Patient started on Donepezil.  

6. Possible depression, for which patient will be started on 

antidepressants.  

7. Elevated INR due to nutritional vitamin K deficiency as the patient 

has not been eating well.  

8. History of prostate cancer, because of which we will PSA testing, 

which is pending. Patient had renal ultrasound which showed cysts. 

Does not appear to have any solid cysts. The patient will need a 

repeat ultrasound as an outpatient. Patient had abdominal CT which 

showed possible cyst, because of which ultrasound was obtained which 

did not show any solid cysts consistent with any cancers lesion.  

9. Regarding weight loss, further work-up can be done as an 

outpatient. Patient will need his primary care physician. Dr. Kamara 

will evaluate the patient tomorrow. Daughter will decide primary care 

physician for him.  

10. Leukocytosis due to possible urinary tract infection. 

11. Moderate protein calorie malnutrition. 



PLAN: Physical therapy evaluation. Continue antibiotics. IV fluids. 

Pending PSA testing.
Previously Declined (within the last year)

## 2021-12-26 NOTE — ED PROVIDER NOTE - CLINICAL SUMMARY MEDICAL DECISION MAKING FREE TEXT BOX
41 F no sig pmhx, no OCP, presents with chest pain that is acute and pleuritic since this AM after 3 days of covid symptoms. not tachycardic, not hypoxic. Possible COVID URI. Also possible PE give new acute, constant pleuritic CP after 3 days of COVID symtpoms. Also possible covid cardiomyopathy, less likely ACS given no risk factors. Possible PNA given constant CP wth fever. Will obtain D dimer, trop and EKG CXR. Reassess for need for CTA r/o PE.   *The above represents an initial assessment/impression. Please refer to progress notes for potential changes in patient clinical course*

## 2021-12-26 NOTE — ED PROVIDER NOTE - CARE PLAN
Principal Discharge DX:	Contact with and (suspected) exposure to covid-19  Secondary Diagnosis:	Chest pain in adult   1

## 2021-12-26 NOTE — ED PROVIDER NOTE - NSFOLLOWUPINSTRUCTIONS_ED_ALL_ED_FT
** Your labs do not show you have a heart attack or blood clot in your lungs.   ** Take over the counter Tylenol or Ibuprofen for pain -- follow dosing directions on original bottle.    Your COVID swab is pending. Self quarantine until you get your results.  You should get a text in 12 hours with your results. If you do not, call the ED for your results.   Stay hydrated     ** Go to the nearest Emergency Department if you experience any new or concerning symptoms, such as:     - chest pain  - difficulty breathing  - passing out  - unable to eat or drink  - fever, chills not better with tylenol

## 2021-12-26 NOTE — ED PROVIDER NOTE - OBJECTIVE STATEMENT
41 F   chest eri constant, this AM, acute onset  NO med problme, no OCP  COVID symptoms for 3 days   covid posivei 4 days ago. normal exam. r/o acs PE. 41 F no sig pmhx, no OCP, presents with chest pain since this AM. Pt's  covid positive 4 days ago, pt has had cough, fever and body aches for 3 days. At 9AM today, pt felt deep chest pain that is midline chest, constant, present when not coughing, worse with inspiration, non exertional heaviness in chest. Pt is non hypoxic, non tachycardic. Has been taking Mucinex and tylenol for covid symptoms.

## 2021-12-26 NOTE — ED PROVIDER NOTE - PROGRESS NOTE DETAILS
Vanessa Delgado M.D. Tox Fellow  Ddimer negative. Pt educated on staying hydrated while taking mucinex.

## 2021-12-26 NOTE — ED ADULT NURSE NOTE - OBJECTIVE STATEMENT
patient complaining of fevers, chest and back pain. reports  is +COVID. patient AOx4, breathing symmetrical and unlabored, EKG completed, cardiac monitoring in place, #20 IV inserted into R. AC, bloods drawn and sent to lab, pt in no acute distress at this time, will continue to monitor.

## 2021-12-26 NOTE — ED PROVIDER NOTE - PATIENT PORTAL LINK FT
You can access the FollowMyHealth Patient Portal offered by [facility] by registering at the following website: [email or web address]. By joining [employer]’s FollowMyHealth portal, you will also be able to view your health information using other applications (apps) compatible with our system.

## 2022-03-21 ENCOUNTER — RESULT REVIEW (OUTPATIENT)
Age: 42
End: 2022-03-21

## 2022-03-31 ENCOUNTER — APPOINTMENT (OUTPATIENT)
Dept: ORTHOPEDIC SURGERY | Facility: CLINIC | Age: 42
End: 2022-03-31
Payer: COMMERCIAL

## 2022-03-31 DIAGNOSIS — M22.2X1 PATELLOFEMORAL DISORDERS, RIGHT KNEE: ICD-10-CM

## 2022-03-31 DIAGNOSIS — M25.561 PAIN IN RIGHT KNEE: ICD-10-CM

## 2022-03-31 PROCEDURE — 73564 X-RAY EXAM KNEE 4 OR MORE: CPT | Mod: RT

## 2022-03-31 PROCEDURE — 99214 OFFICE O/P EST MOD 30 MIN: CPT

## 2022-03-31 NOTE — END OF VISIT
[FreeTextEntry3] : All medical record entries made by the Harveyibe were at my, Dr. Arthur West, direction and personally dictated by me on 03/31/2022. I have reviewed the chart and agree that the record accurately reflects my personal performance of the history, physical exam, assessment and plan. I have also personally directed, reviewed, and agreed with the chart.

## 2022-03-31 NOTE — HISTORY OF PRESENT ILLNESS
[de-identified] : DENISE BURNETT is a 40 year female presenting to the office complaining of right knee pain. Patient reports pain originally began on 08/13/2020 while tubing. Patient reports getting thrown off the tube so high and she landed back in the tub.. She went to [facility] ED at the time where she had xrays of the right knee and tib/fib negative for acute fracture/dislocation.  She was treated conservatively with good relief.\par She now returns with pain in the anterior knee, especially while doing squats.  Patient reports a recent 20lb weight gain.

## 2022-03-31 NOTE — DISCUSSION/SUMMARY
[de-identified] : The underlying pathophysiology was reviewed in great detail with the patient as well as the various treatment options, including ice, analgesics, NSAIDs, Physical therapy, steroid injections.\par \par Activity modifications and restrictions were discussed. I advised avoiding deep bending, squatting and high intensity activity.\par \par A home exercise sheet was given and discussed with the patient to follow. \par \par I have recommended utilizing a knee sleeve to provide added support and stability. \par \par I discussed the importance of weight loss to alleviate her knee pain.\par \par I recommended a course of physical therapy for the right knee to improve ROM and strength. A script was provided today. \par \par FU 3 months\par \par All questions were answered, all alternatives discussed and the patient is in complete agreement with that plan. Follow-up appointment as instructed. Any issues and the patient will call or come in sooner.

## 2022-03-31 NOTE — ADDENDUM
[FreeTextEntry1] : I, Gilberto Harrison, acted solely as a scribe for Dr. Arthur West on this date 03/31/2022.

## 2022-05-17 ENCOUNTER — APPOINTMENT (OUTPATIENT)
Dept: ULTRASOUND IMAGING | Facility: HOSPITAL | Age: 42
End: 2022-05-17
Payer: COMMERCIAL

## 2022-05-17 ENCOUNTER — OUTPATIENT (OUTPATIENT)
Dept: OUTPATIENT SERVICES | Facility: HOSPITAL | Age: 42
LOS: 1 days | End: 2022-05-17
Payer: COMMERCIAL

## 2022-05-17 ENCOUNTER — APPOINTMENT (OUTPATIENT)
Dept: MAMMOGRAPHY | Facility: HOSPITAL | Age: 42
End: 2022-05-17
Payer: COMMERCIAL

## 2022-05-17 DIAGNOSIS — Z00.8 ENCOUNTER FOR OTHER GENERAL EXAMINATION: ICD-10-CM

## 2022-05-17 PROCEDURE — 76830 TRANSVAGINAL US NON-OB: CPT | Mod: 26

## 2022-05-17 PROCEDURE — 77067 SCR MAMMO BI INCL CAD: CPT | Mod: 26

## 2022-05-17 PROCEDURE — 76830 TRANSVAGINAL US NON-OB: CPT

## 2022-05-17 PROCEDURE — 77063 BREAST TOMOSYNTHESIS BI: CPT

## 2022-05-17 PROCEDURE — 76641 ULTRASOUND BREAST COMPLETE: CPT | Mod: 26,50

## 2022-05-17 PROCEDURE — 76856 US EXAM PELVIC COMPLETE: CPT | Mod: 26

## 2022-05-17 PROCEDURE — 77067 SCR MAMMO BI INCL CAD: CPT

## 2022-05-17 PROCEDURE — 77063 BREAST TOMOSYNTHESIS BI: CPT | Mod: 26

## 2022-05-17 PROCEDURE — 76641 ULTRASOUND BREAST COMPLETE: CPT

## 2022-05-17 PROCEDURE — 76856 US EXAM PELVIC COMPLETE: CPT

## 2022-08-25 ENCOUNTER — EMERGENCY (EMERGENCY)
Facility: HOSPITAL | Age: 42
LOS: 1 days | Discharge: HOME | End: 2022-08-25
Attending: INTERNAL MEDICINE | Admitting: INTERNAL MEDICINE
Payer: COMMERCIAL

## 2022-08-25 VITALS
HEART RATE: 78 BPM | OXYGEN SATURATION: 98 % | DIASTOLIC BLOOD PRESSURE: 84 MMHG | SYSTOLIC BLOOD PRESSURE: 121 MMHG | RESPIRATION RATE: 16 BRPM

## 2022-08-25 VITALS
SYSTOLIC BLOOD PRESSURE: 129 MMHG | TEMPERATURE: 97 F | HEART RATE: 83 BPM | RESPIRATION RATE: 18 BRPM | WEIGHT: 158.73 LBS | DIASTOLIC BLOOD PRESSURE: 85 MMHG | OXYGEN SATURATION: 98 % | HEIGHT: 64 IN

## 2022-08-25 PROCEDURE — 99283 EMERGENCY DEPT VISIT LOW MDM: CPT

## 2022-08-25 RX ORDER — IBUPROFEN 200 MG
600 TABLET ORAL ONCE
Refills: 0 | Status: COMPLETED | OUTPATIENT
Start: 2022-08-25 | End: 2022-08-25

## 2022-08-25 RX ORDER — IBUPROFEN 200 MG
1 TABLET ORAL
Qty: 12 | Refills: 0
Start: 2022-08-25 | End: 2022-08-27

## 2022-08-25 RX ADMIN — Medication 1 TABLET(S): at 18:30

## 2022-08-25 RX ADMIN — Medication 600 MILLIGRAM(S): at 18:30

## 2022-08-25 NOTE — ED PROVIDER NOTE - NS ED ATTENDING STATEMENT MOD
This was a shared visit with the RIGO. I reviewed and verified the documentation and independently performed the documented:

## 2022-08-25 NOTE — ED PROVIDER NOTE - PHYSICAL EXAMINATION
right 2nd finger- + nail avulsion noted, positive ROM, positive radial pulse, less than 2 sec cap refill

## 2022-08-25 NOTE — ED PROVIDER NOTE - CLINICAL SUMMARY MEDICAL DECISION MAKING FREE TEXT BOX
42 yr old female with no pmhx presents with right 2nd nail injury. Pt reports she was playing with play doo with children and pushed down on the table and nail bent back. No other injuries noted.  right 2nd finger- + nail avulsion noted, positive ROM, positive radial pulse, less than 2 sec cap refill   steri strips and bulky dressing applied, pt tolerated, motrin given. pt stable for dc.

## 2022-08-25 NOTE — ED PROVIDER NOTE - NSFOLLOWUPINSTRUCTIONS_ED_ALL_ED_FT
Nail Avulsion    WHAT YOU NEED TO KNOW:    Nail avulsion is when part or all of a nail is torn away or removed from the nail bed. Avulsion may happen on your finger or toe. Common causes include ingrown nail, injury, or infection. The nail bed will form a hard layer and then a new nail may grow. The nail bed will be sensitive until the hard layer forms. You will need to keep it covered to prevent infection or more injury. You may need to care for your nail area for several months as the new nail grows.    DISCHARGE INSTRUCTIONS:    Return to the emergency department if:   •Blood soaks through your bandage.      •You have a red streak running up your leg or arm.      Contact your healthcare provider if:   •You have a fever or chills.      •Your injured area is red, swollen, or draining pus.       •You have new or worsening pain, or pain that does not get better with medicine.      •You have questions or concerns about your condition or care.      Medicines:   •Antibiotics may help treat or prevent a bacterial infection.      •Acetaminophen decreases pain and fever. It is available without a doctor's order. Ask how much to take and how often to take it. Follow directions. Acetaminophen can cause liver damage if not taken correctly.      •NSAIDs, such as ibuprofen, help decrease swelling, pain, and fever. This medicine is available with or without a doctor's order. NSAIDs can cause stomach bleeding or kidney problems in certain people. If you take blood thinner medicine, always ask your healthcare provider if NSAIDs are safe for you. Always read the medicine label and follow directions.      •Take your medicine as directed. Contact your healthcare provider if you think your medicine is not helping or if you have side effects. Tell him or her if you are allergic to any medicine. Keep a list of the medicines, vitamins, and herbs you take. Include the amounts, and when and why you take them. Bring the list or the pill bottles to follow-up visits. Carry your medicine list with you in case of an emergency.      Self-care:   •Keep your nail area clean, dry, and covered. When you are allowed to bathe, carefully wash the area with soap and water. Put on a clean, new bandage. Do not use an adhesive bandage. It may stick to the wound and cause pain when you remove it. Ask your healthcare provider what kind of bandage to use. Change your bandage when it gets wet or dirty. Your healthcare provider may suggest that you change the bandage every 24 hours for the first few days.      •Elevate your hand or foot above the level of your heart as often as you can for 24 hours. This will help decrease swelling and pain. Prop your hand or foot on pillows or blankets to keep it elevated comfortably.       •Apply ice on your wound area for 15 to 20 minutes every hour or as directed. Use an ice pack, or put crushed ice in a plastic bag. Cover it with a towel. Ice helps prevent tissue damage and decreases swelling and pain.      •Do not wear tight shoes or shoes that do not fit well. Do not wear tights or pantyhose. Wear cotton socks.      •Ask when you can return to work, school, or your usual sports and activities.      Follow up with your healthcare provider as directed: You may be referred to a hand or foot specialist. Write down your questions so you remember to ask them during your visits.        © Copyright [employer] 2022           back to top                          © Copyright [employer] 2022

## 2022-08-25 NOTE — ED PROVIDER NOTE - NS ED MD DISPO DISCHARGE
RASTA Rawls from NICU received infant from mother/baby to prepare for transport [place].    Home

## 2022-08-25 NOTE — ED PROVIDER NOTE - PATIENT PORTAL LINK FT
You can access the FollowMyHealth Patient Portal offered by Health system by registering at the following website: [email or web address]. By joining [employer]’s FollowMyHealth portal, you will also be able to view your health information using other applications (apps) compatible with our system.

## 2022-08-25 NOTE — ED ADULT NURSE NOTE - OBJECTIVE STATEMENT
Pt presents for right finger pain s.p playing with play-do with child and artifical nail came off. Pt arrived awake and alert x 4 speaking in full sentences. Bleeding controlled. full ROM to hand and full sensory.

## 2022-08-25 NOTE — ED PROVIDER NOTE - OBJECTIVE STATEMENT
42 yr old female with no pmhx presents with right 2nd nail injury. Pt reports she was playing with play doo with children and pushed down on the table and nail bent back. No other injuries noted.

## 2022-08-25 NOTE — ED PROVIDER NOTE - ATTENDING APP SHARED VISIT CONTRIBUTION OF CARE
42 yr old female with no pmhx presents with right 2nd nail injury. Pt reports she was playing with play doo with children and pushed down on the table and nail bent back. No other injuries noted.  right 2nd finger- + nail avulsion noted, positive ROM, positive radial pulse, less than 2 sec cap refill   steri strips and bulky dressing applied, pt tolerated, motrin given. pt stable for dc.  Dr. Moscoso:  I have reviewed and discussed with the PA/ resident the case specifics, including the history, physical assessment, evaluation, conclusion, laboratory results, and medical plan. I agree with the contents, and conclusions. I have personally examined, and interviewed the patient.

## 2022-08-25 NOTE — ED ADULT TRIAGE NOTE - GLASGOW COMA SCALE: BEST VERBAL RESPONSE, MLM
vss,afebrile, pt reactive to anesthesia, pt has met discharge criteria, discharge instructions and verbalized understanding. iv d/c'd, transport at . (V5) oriented

## 2022-09-16 NOTE — ED ADULT TRIAGE NOTE - WEIGHT IN KG
Geronimo reports Constipation - He has not had a Bowel movement for >7 days    - Received narcotics several times this week - Surgical procedure today and ER 3x due to Kidney Stone  - Denies having an urge to defecate    Advised to see PCP within 24 hours  Care Advice reviewed    Transferred to scheduling    Jolly Dobbins RN  [facility]      Reason for Disposition    Last bowel movement (BM) > 4 days ago    Additional Information    Negative: Patient sounds very sick or weak to the triager    Negative: [1] Vomiting AND [2] abdomen looks much more swollen than usual    Negative: [1] Vomiting AND [2] contains bile (green color)    Negative: [1] Constant abdominal pain AND [2] present > 2 hours    Negative: [1] Rectal pain or fullness from fecal impaction (rectum full of stool) AND [2] NOT better after SITZ bath, suppository or enema    Negative: [1] Intermittent mild abdominal pain AND [2] fever    Negative: Abdomen is more swollen than usual    Protocols used: CONSTIPATION-A-AH      
68.9

## 2022-12-08 ENCOUNTER — APPOINTMENT (OUTPATIENT)
Dept: ULTRASOUND IMAGING | Facility: CLINIC | Age: 42
End: 2022-12-08

## 2022-12-08 ENCOUNTER — OUTPATIENT (OUTPATIENT)
Dept: OUTPATIENT SERVICES | Facility: HOSPITAL | Age: 42
LOS: 1 days | End: 2022-12-08
Payer: COMMERCIAL

## 2022-12-08 DIAGNOSIS — Z00.8 ENCOUNTER FOR OTHER GENERAL EXAMINATION: ICD-10-CM

## 2022-12-08 DIAGNOSIS — R10.2 PELVIC AND PERINEAL PAIN: ICD-10-CM

## 2022-12-08 PROCEDURE — 76856 US EXAM PELVIC COMPLETE: CPT | Mod: 26

## 2022-12-08 PROCEDURE — 76830 TRANSVAGINAL US NON-OB: CPT

## 2022-12-08 PROCEDURE — 76830 TRANSVAGINAL US NON-OB: CPT | Mod: 26

## 2022-12-08 PROCEDURE — 76856 US EXAM PELVIC COMPLETE: CPT

## 2023-01-30 ENCOUNTER — EMERGENCY (EMERGENCY)
Facility: HOSPITAL | Age: 43
LOS: 1 days | Discharge: HOME | End: 2023-01-30
Attending: EMERGENCY MEDICINE | Admitting: EMERGENCY MEDICINE
Payer: COMMERCIAL

## 2023-01-30 VITALS
DIASTOLIC BLOOD PRESSURE: 87 MMHG | OXYGEN SATURATION: 99 % | HEART RATE: 83 BPM | TEMPERATURE: 98 F | WEIGHT: 184.97 LBS | SYSTOLIC BLOOD PRESSURE: 129 MMHG | RESPIRATION RATE: 16 BRPM | HEIGHT: 64 IN

## 2023-01-30 VITALS
OXYGEN SATURATION: 98 % | RESPIRATION RATE: 17 BRPM | SYSTOLIC BLOOD PRESSURE: 108 MMHG | DIASTOLIC BLOOD PRESSURE: 72 MMHG | HEART RATE: 72 BPM

## 2023-01-30 LAB
ALBUMIN SERPL ELPH-MCNC: 4 G/DL — SIGNIFICANT CHANGE UP (ref 3.3–5)
ALP SERPL-CCNC: 59 U/L — SIGNIFICANT CHANGE UP (ref 40–120)
ALT FLD-CCNC: 21 U/L — SIGNIFICANT CHANGE UP (ref 10–45)
ANION GAP SERPL CALC-SCNC: 12 MMOL/L — SIGNIFICANT CHANGE UP (ref 5–17)
AST SERPL-CCNC: 18 U/L — SIGNIFICANT CHANGE UP (ref 10–40)
BASOPHILS # BLD AUTO: 0.06 K/UL — SIGNIFICANT CHANGE UP (ref 0–0.2)
BASOPHILS NFR BLD AUTO: 0.3 % — SIGNIFICANT CHANGE UP (ref 0–2)
BILIRUB SERPL-MCNC: 0.4 MG/DL — SIGNIFICANT CHANGE UP (ref 0.2–1.2)
BUN SERPL-MCNC: 16 MG/DL — SIGNIFICANT CHANGE UP (ref 7–23)
CALCIUM SERPL-MCNC: 9.6 MG/DL — SIGNIFICANT CHANGE UP (ref 8.4–10.5)
CHLORIDE SERPL-SCNC: 102 MMOL/L — SIGNIFICANT CHANGE UP (ref 96–108)
CO2 SERPL-SCNC: 26 MMOL/L — SIGNIFICANT CHANGE UP (ref 22–31)
CREAT SERPL-MCNC: 0.78 MG/DL — SIGNIFICANT CHANGE UP (ref 0.5–1.3)
EGFR: 97 ML/MIN/1.73M2 — SIGNIFICANT CHANGE UP
EOSINOPHIL # BLD AUTO: 0.08 K/UL — SIGNIFICANT CHANGE UP (ref 0–0.5)
EOSINOPHIL NFR BLD AUTO: 0.4 % — SIGNIFICANT CHANGE UP (ref 0–6)
GLUCOSE SERPL-MCNC: 127 MG/DL — HIGH (ref 70–99)
HCG SERPL-ACNC: <1 MIU/ML — SIGNIFICANT CHANGE UP
HCT VFR BLD CALC: 41.1 % — SIGNIFICANT CHANGE UP (ref 34.5–45)
HGB BLD-MCNC: 13.7 G/DL — SIGNIFICANT CHANGE UP (ref 11.5–15.5)
IMM GRANULOCYTES NFR BLD AUTO: 0.7 % — SIGNIFICANT CHANGE UP (ref 0–0.9)
LIDOCAIN IGE QN: 74 U/L — SIGNIFICANT CHANGE UP (ref 73–393)
LYMPHOCYTES # BLD AUTO: 12.8 % — LOW (ref 13–44)
LYMPHOCYTES # BLD AUTO: 2.28 K/UL — SIGNIFICANT CHANGE UP (ref 1–3.3)
MCHC RBC-ENTMCNC: 28.8 PG — SIGNIFICANT CHANGE UP (ref 27–34)
MCHC RBC-ENTMCNC: 33.3 GM/DL — SIGNIFICANT CHANGE UP (ref 32–36)
MCV RBC AUTO: 86.3 FL — SIGNIFICANT CHANGE UP (ref 80–100)
MONOCYTES # BLD AUTO: 1.35 K/UL — HIGH (ref 0–0.9)
MONOCYTES NFR BLD AUTO: 7.6 % — SIGNIFICANT CHANGE UP (ref 2–14)
NEUTROPHILS # BLD AUTO: 13.93 K/UL — HIGH (ref 1.8–7.4)
NEUTROPHILS NFR BLD AUTO: 78.2 % — HIGH (ref 43–77)
NRBC # BLD: 0 /100 WBCS — SIGNIFICANT CHANGE UP (ref 0–0)
PLATELET # BLD AUTO: 309 K/UL — SIGNIFICANT CHANGE UP (ref 150–400)
POTASSIUM SERPL-MCNC: 3.6 MMOL/L — SIGNIFICANT CHANGE UP (ref 3.5–5.3)
POTASSIUM SERPL-SCNC: 3.6 MMOL/L — SIGNIFICANT CHANGE UP (ref 3.5–5.3)
PROT SERPL-MCNC: 8.9 G/DL — HIGH (ref 6–8.3)
RBC # BLD: 4.76 M/UL — SIGNIFICANT CHANGE UP (ref 3.8–5.2)
RBC # FLD: 12.8 % — SIGNIFICANT CHANGE UP (ref 10.3–14.5)
SODIUM SERPL-SCNC: 140 MMOL/L — SIGNIFICANT CHANGE UP (ref 135–145)
WBC # BLD: 17.83 K/UL — HIGH (ref 3.8–10.5)
WBC # FLD AUTO: 17.83 K/UL — HIGH (ref 3.8–10.5)

## 2023-01-30 PROCEDURE — 93005 ELECTROCARDIOGRAM TRACING: CPT

## 2023-01-30 PROCEDURE — 96374 THER/PROPH/DIAG INJ IV PUSH: CPT

## 2023-01-30 PROCEDURE — 99284 EMERGENCY DEPT VISIT MOD MDM: CPT | Mod: 25

## 2023-01-30 PROCEDURE — 93010 ELECTROCARDIOGRAM REPORT: CPT

## 2023-01-30 PROCEDURE — 36415 COLL VENOUS BLD VENIPUNCTURE: CPT

## 2023-01-30 PROCEDURE — 99284 EMERGENCY DEPT VISIT MOD MDM: CPT

## 2023-01-30 PROCEDURE — 84702 CHORIONIC GONADOTROPIN TEST: CPT

## 2023-01-30 PROCEDURE — 96375 TX/PRO/DX INJ NEW DRUG ADDON: CPT

## 2023-01-30 PROCEDURE — 85025 COMPLETE CBC W/AUTO DIFF WBC: CPT

## 2023-01-30 PROCEDURE — 80053 COMPREHEN METABOLIC PANEL: CPT

## 2023-01-30 PROCEDURE — 83690 ASSAY OF LIPASE: CPT

## 2023-01-30 PROCEDURE — 96361 HYDRATE IV INFUSION ADD-ON: CPT

## 2023-01-30 RX ORDER — SODIUM CHLORIDE 9 MG/ML
2000 INJECTION INTRAMUSCULAR; INTRAVENOUS; SUBCUTANEOUS ONCE
Refills: 0 | Status: COMPLETED | OUTPATIENT
Start: 2023-01-30 | End: 2023-01-30

## 2023-01-30 RX ORDER — ONDANSETRON 8 MG/1
1 TABLET, FILM COATED ORAL
Qty: 20 | Refills: 0
Start: 2023-01-30

## 2023-01-30 RX ORDER — KETOROLAC TROMETHAMINE 30 MG/ML
30 SYRINGE (ML) INJECTION ONCE
Refills: 0 | Status: DISCONTINUED | OUTPATIENT
Start: 2023-01-30 | End: 2023-01-30

## 2023-01-30 RX ORDER — ONDANSETRON 8 MG/1
4 TABLET, FILM COATED ORAL ONCE
Refills: 0 | Status: COMPLETED | OUTPATIENT
Start: 2023-01-30 | End: 2023-01-30

## 2023-01-30 RX ADMIN — SODIUM CHLORIDE 2000 MILLILITER(S): 9 INJECTION INTRAMUSCULAR; INTRAVENOUS; SUBCUTANEOUS at 22:39

## 2023-01-30 RX ADMIN — Medication 30 MILLIGRAM(S): at 22:39

## 2023-01-30 RX ADMIN — Medication 30 MILLIGRAM(S): at 23:09

## 2023-01-30 RX ADMIN — ONDANSETRON 4 MILLIGRAM(S): 8 TABLET, FILM COATED ORAL at 22:38

## 2023-01-30 NOTE — ED PROVIDER NOTE - PATIENT PORTAL LINK FT
You can access the FollowMyHealth Patient Portal offered by [facility] by registering at the following website: [email or web address]. By joining [employer]s FollowMyHealth portal, you will also be able to view your health information using other applications (apps) compatible with our system.

## 2023-01-30 NOTE — ED PROVIDER NOTE - NSFOLLOWUPINSTRUCTIONS_ED_ALL_ED_FT
- drink plenty of fluids like pedialyte, gatorade, clear soups    - take zofran for nausea    - take tylenol and or motrin for pain or fever as needed    - return for worse vomiting, severe pains, feeling faint, or other concerns    Follow Up in 1-3 Days with your own doctor or with  [address] [address]  Phone: [phone]    Gastroenteritis    WHAT YOU NEED TO KNOW:    Gastroenteritis, or stomach flu, is an infection of the stomach and intestines. Digestive Tract         DISCHARGE INSTRUCTIONS:    Call 911 for any of the following:     You have trouble breathing or a very fast pulse.        Return to the emergency department if:     You see blood in your diarrhea.      You cannot stop vomiting.      You have not urinated for 12 hours.       You feel like you are going to faint.    Contact your healthcare provider if:     You have a fever.      You continue to vomit or have diarrhea, even after treatment.      You see worms in your diarrhea.      Your mouth or eyes are dry. You are not urinating as much or as often.      You have questions or concerns about your condition or care.    Medicines:     Medicines may be given to stop vomiting or diarrhea, decrease abdominal cramps, or treat an infection.      Take your medicine as directed. Contact your healthcare provider if you think your medicine is not helping or if you have side effects. Tell him or her if you are allergic to any medicine. Keep a list of the medicines, vitamins, and herbs you take. Include the amounts, and when and why you take them. Bring the list or the pill bottles to follow-up visits. Carry your medicine list with you in case of an emergency.    Manage your symptoms:     Drink liquids as directed. Ask your healthcare provider how much liquid to drink each day, and which liquids are best for you. You may also need to drink an oral rehydration solution (ORS). An ORS has the right amounts of sugar, salt, and minerals in water to replace body fluids.      Eat bland foods. When you feel hungry, begin eating soft, bland foods. Examples are bananas, clear soup, potatoes, and applesauce. Do not have dairy products, alcohol, sugary drinks, or drinks with caffeine until you feel better.      Rest as much as possible. Slowly start to do more each day when you begin to feel better.    Prevent the spread of gastroenteritis: Gastroenteritis can spread easily. Keep yourself, your family, and your surroundings clean to help prevent the spread of gastroenteritis:     Wash your hands often. Use soap and water. Wash your hands after you use the bathroom, change a child's diapers, or sneeze. Wash your hands before you prepare or eat food. Handwashing           Clean surfaces and do laundry often. Wash your clothes and towels separately from the rest of the laundry. Clean surfaces in your home with antibacterial  or bleach.      Clean food thoroughly and cook safely. Wash raw vegetables before you cook. Cook meat, fish, and eggs fully. Do not use the same dishes for raw meat as you do for other foods. Refrigerate any leftover food immediately.      Be aware when you camp or travel. Drink only clean water. Do not drink from rivers or lakes unless you purify or boil the water first. When you travel, drink bottled water and do not add ice. Do not eat fruit that has not been peeled. Do not eat raw fish or meat that is not fully cooked.     Follow up with your healthcare provider as directed: Write down your questions so you remember to ask them during your visits.      Syncope    WHAT YOU NEED TO KNOW:    Syncope is also called fainting or passing out. Syncope is a sudden, temporary loss of consciousness, followed by a fall from a standing or sitting position. Syncope ranges from not serious to a sign of a more serious condition that needs to be treated. You can control some health conditions that cause syncope. Your healthcare providers can help you create a plan to manage syncope and prevent episodes.    DISCHARGE INSTRUCTIONS:    Seek care immediately if:     You are bleeding because you hit your head when you fainted.     You suddenly have double vision, difficulty speaking, numbness, and cannot move your arms or legs.    You have chest pain and trouble breathing.    You vomit blood or material that looks like coffee grounds.      You see blood in your bowel movement.    Contact your healthcare provider if:     You have new or worsening symptoms.    You have another syncope episode.    You have a headache, fast heartbeat, or feel too dizzy to stand up.    You have questions or concerns about your condition or care.    Medicines:     Medicines may be needed to help your heart pump strongly and regularly. Your healthcare provider may also make changes to any medicines that are causing syncope.     Take your medicine as directed. Contact your healthcare provider if you think your medicine is not helping or if you have side effects. Tell him or her if you are allergic to any medicine. Keep a list of the medicines, vitamins, and herbs you take. Include the amounts, and when and why you take them. Bring the list or the pill bottles to follow-up visits. Carry your medicine list with you in case of an emergency.    Follow up with your healthcare provider as directed: Write down your questions so you remember to ask them during your visits.     Manage syncope:     Keep a record of your syncope episodes. Include your symptoms and your activity before and after the episode. The record can help your healthcare provider find the cause of your syncope and help you manage episodes.    Sit or lie down when needed. This includes when you feel dizzy, your throat is getting tight, and your vision changes. Raise your legs above the level of your heart.    Take slow, deep breaths if you stat to breathe faster with anxiety or fear. This can help decrease dizziness and the feeling that you might faint.     Check your blood pressure often. This is important if you take medicine to lower your blood pressure. Check your blood pressure when you are lying down and when you are standing. Ask how often to check during the day. Keep a record of your blood pressure numbers. Your healthcare provider may use the record to help plan your treatment.How to take a Blood Pressure         Prevent a syncope episode:     Move slowly and let yourself get used to one position before you move to another position. This is very important when you change from a lying or sitting position to a standing position. Take some deep breaths before you stand up from a lying position. Stand up slowly. Sudden movements may cause a fainting spell. Sit on the side of the bed or couch for a few minutes before you stand up. If you are on bedrest, try to be upright for about 2 hours each day, or as directed. Do not lock your legs if you are standing for a long period of time. Move your legs and bend your knees to keep blood flowing.    Follow your healthcare provider's recommendations. Your provider may recommend that you drink more liquids to prevent dehydration. You may also need to have more salt to keep your blood pressure from dropping too low and causing syncope. Your provider will tell you how much liquid and sodium to have each day. He or she will also tell you how much physical activity is safe for you. This will depend on what is causing your syncope.    Watch for signs of low blood sugar. These include hunger, nervousness, sweating, and fast or fluttery heartbeats. Talk with your healthcare provider about ways to keep your blood sugar level steady.    Do not strain if you are constipated. You may faint if you strain to have a bowel movement. Walking is the best way to get your bowels moving. Eat foods high in fiber to make it easier to have a bowel movement. Good examples are high-fiber cereals, beans, vegetables, and whole-grain breads. Prune juice may help make bowel movements softer.    Be careful in hot weather. Heat can cause a syncope episode. Limit activity done outside on hot days. Physical activity in hot weather can lead to dehydration. This can cause an episode.

## 2023-01-30 NOTE — ED PROVIDER NOTE - PHYSICAL EXAMINATION
exam:   General: NAD.   HEENT: eyes perrl,   cor: RRR, s1s2, 2+rad pulses.   lungs: ctabl, no resp distress.   abd: soft, ntnd.   neuro: a&ox3, cn2-12 intact, PLATA, 5/5 strength c nl sensation all extremities, nl coordination.   MSK: no extremity swelling.  Skin: normal, no rash

## 2023-01-30 NOTE — ED ADULT NURSE NOTE - NSIMPLEMENTINTERV_GEN_ALL_ED
Implemented All Fall with Harm Risk Interventions:  Katy to call system. Call bell, personal items and telephone within reach. Instruct patient to call for assistance. Room bathroom lighting operational. Non-slip footwear when patient is off stretcher. Physically safe environment: no spills, clutter or unnecessary equipment. Stretcher in lowest position, wheels locked, appropriate side rails in place. Provide visual cue, wrist band, yellow gown, etc. Monitor gait and stability. Monitor for mental status changes and reorient to person, place, and time. Review medications for side effects contributing to fall risk. Reinforce activity limits and safety measures with patient and family. Provide visual clues: red socks.

## 2023-01-30 NOTE — ED PROVIDER NOTE - CLINICAL SUMMARY MEDICAL DECISION MAKING FREE TEXT BOX
42-year-old female with history of anxiety, complaining of vomiting, nonbloody nonbilious about 10 times since about 3:00 this afternoon associated with multiple episodes of diarrhea, nonbloody none tarry.  Also with abdominal pains.  Patient states she fainted 3 times in a row while vomiting.  No fall or injury.   was holding her.  No chest pain shortness of breath, palpitations.  No travel.  Patient has sick contact at home.  Daughter has been throwing up and having diarrhea last few days.    On exam, vitals normal, unremarkable.  Patient appears mildly ill appearing, weak.  Neuro intact.  Abdomen soft nontender.  Most likely viral gastroenteritis.  Rule out dehydration, LAURA, electrolyte abnormality.  Check labs, give IV fluids Zofran Toradol.  Check EKG rule out arrhythmia.  Reassess 42-year-old female with history of anxiety, complaining of vomiting, nonbloody nonbilious about 10 times since about 3:00 this afternoon associated with multiple episodes of diarrhea, nonbloody none tarry.  Also with abdominal pains.  Patient states she fainted 3 times in a row while vomiting.  No fall or injury.   was holding her.  No chest pain shortness of breath, palpitations.  No travel.  Patient has sick contact at home.  Daughter has been throwing up and having diarrhea last few days.    On exam, vitals normal, unremarkable.  Patient appears mildly ill appearing, weak.  Neuro intact.  Abdomen soft nontender.  Most likely viral gastroenteritis.  Rule out dehydration, LAURA, electrolyte abnormality.  Check labs, give IV fluids Zofran Toradol.  Check EKG rule out arrhythmia.  Reassess    update: wbc elevated, nonspecific. c/w gastroenteritis. lytes, CMP ok. pt feeling better. tolerated po fluids. EKG normal. pt feeling better. f/u pmd. zofran prescribed

## 2023-01-30 NOTE — ED PROVIDER NOTE - OBJECTIVE STATEMENT
42-year-old female with history of anxiety, complaining of vomiting, nonbloody nonbilious about 10 times since about 3:00 this afternoon associated with multiple episodes of diarrhea, nonbloody none tarry.  Also with abdominal pains.  Patient states she fainted 3 times in a row while vomiting.  No fall or injury.   was holding her.  No chest pain shortness of breath, palpitations.  No travel.  Patient has sick contact at home.  Daughter has been throwing up and having diarrhea last few days.

## 2023-01-30 NOTE — ED ADULT NURSE NOTE - OBJECTIVE STATEMENT
Pt came from home with c/o abdominal pain, n/v/d that started today at around 3pm. Pt states that she vomited about 10 times with multiple episodes of diarrhea since 3pm this afternoon. Pt also states that she fainted 3 times in a row, but did not fall or get injured because her  caught her. PMH anxiety. Pt also states that her daughter had similar symptoms a few days ago and is now recently recovering from a stomach virus. Pt denies chest pain, sob, fevers/chills or any other complaints.

## 2023-07-20 NOTE — ED ADULT NURSE NOTE - OBJECTIVE STATEMENT
Glasses/Partially impaired: cannot see medication labels or newsprint, but can see obstacles in path, and the surrounding layout; can count fingers at arm's length Pt. reports that Pt. reports that she developed visual disturbances, pt reports she sees "triangle lights" and has a "pressure headache" nausea with no vomiting. Pt reports that light and loud sounds aggravate her pain and she feels "slightly dizzy". Pt denies any other neurological s/s.   BGM done on arrival, pt. ambulatory with steady gait.

## 2023-10-10 NOTE — ED ADULT NURSE NOTE - HOW OFTEN DO YOU HAVE A DRINK CONTAINING ALCOHOL?
You saw patient on 9/19 and advise her you were going to put a monthly standing PT/INR order plus additional labs.  Somehow they never went through.      Can you please put those through with instructions on when you want her to have the labs drawn?  Thank you!  
Never

## 2023-10-30 ENCOUNTER — NON-APPOINTMENT (OUTPATIENT)
Age: 43
End: 2023-10-30

## 2023-11-09 DIAGNOSIS — N60.11 DIFFUSE CYSTIC MASTOPATHY OF RIGHT BREAST: ICD-10-CM

## 2023-11-09 DIAGNOSIS — Z12.39 ENCOUNTER FOR OTHER SCREENING FOR MALIGNANT NEOPLASM OF BREAST: ICD-10-CM

## 2023-11-09 DIAGNOSIS — N60.12 DIFFUSE CYSTIC MASTOPATHY OF RIGHT BREAST: ICD-10-CM

## 2023-11-15 ENCOUNTER — APPOINTMENT (OUTPATIENT)
Dept: OBGYN | Facility: CLINIC | Age: 43
End: 2023-11-15
Payer: COMMERCIAL

## 2023-11-15 VITALS
BODY MASS INDEX: 30.73 KG/M2 | DIASTOLIC BLOOD PRESSURE: 70 MMHG | WEIGHT: 180 LBS | HEIGHT: 64 IN | HEART RATE: 75 BPM | TEMPERATURE: 97.4 F | OXYGEN SATURATION: 98 % | SYSTOLIC BLOOD PRESSURE: 120 MMHG

## 2023-11-15 DIAGNOSIS — R10.2 PELVIC AND PERINEAL PAIN: ICD-10-CM

## 2023-11-15 DIAGNOSIS — N89.8 OTHER SPECIFIED NONINFLAMMATORY DISORDERS OF VAGINA: ICD-10-CM

## 2023-11-15 DIAGNOSIS — N76.0 ACUTE VAGINITIS: ICD-10-CM

## 2023-11-15 LAB
HCG UR QL: NEGATIVE
QUALITY CONTROL: YES

## 2023-11-15 PROCEDURE — 99203 OFFICE O/P NEW LOW 30 MIN: CPT | Mod: 25

## 2023-11-15 PROCEDURE — 81025 URINE PREGNANCY TEST: CPT

## 2023-11-15 RX ORDER — FLUCONAZOLE 150 MG/1
150 TABLET ORAL
Qty: 3 | Refills: 1 | Status: ACTIVE | COMMUNITY
Start: 2023-11-15 | End: 1900-01-01

## 2023-11-15 RX ORDER — METRONIDAZOLE 7.5 MG/G
0.75 GEL VAGINAL
Qty: 1 | Refills: 1 | Status: ACTIVE | COMMUNITY
Start: 2023-11-15 | End: 1900-01-01

## 2023-11-16 ENCOUNTER — NON-APPOINTMENT (OUTPATIENT)
Age: 43
End: 2023-11-16

## 2023-11-17 LAB
BACTERIA UR CULT: NORMAL
C TRACH RRNA SPEC QL NAA+PROBE: NOT DETECTED
CANDIDA VAG CYTO: DETECTED
G VAGINALIS+PREV SP MTYP VAG QL MICRO: DETECTED
N GONORRHOEA RRNA SPEC QL NAA+PROBE: NOT DETECTED
SOURCE AMPLIFICATION: NORMAL
T VAGINALIS VAG QL WET PREP: NOT DETECTED

## 2023-11-24 ENCOUNTER — APPOINTMENT (OUTPATIENT)
Dept: ULTRASOUND IMAGING | Facility: CLINIC | Age: 43
End: 2023-11-24
Payer: COMMERCIAL

## 2023-11-24 ENCOUNTER — RESULT REVIEW (OUTPATIENT)
Age: 43
End: 2023-11-24

## 2023-11-24 ENCOUNTER — OUTPATIENT (OUTPATIENT)
Dept: OUTPATIENT SERVICES | Facility: HOSPITAL | Age: 43
LOS: 1 days | End: 2023-11-24
Payer: COMMERCIAL

## 2023-11-24 ENCOUNTER — APPOINTMENT (OUTPATIENT)
Dept: MAMMOGRAPHY | Facility: CLINIC | Age: 43
End: 2023-11-24
Payer: COMMERCIAL

## 2023-11-24 DIAGNOSIS — N60.11 DIFFUSE CYSTIC MASTOPATHY OF RIGHT BREAST: ICD-10-CM

## 2023-11-24 DIAGNOSIS — Z12.39 ENCOUNTER FOR OTHER SCREENING FOR MALIGNANT NEOPLASM OF BREAST: ICD-10-CM

## 2023-11-24 PROCEDURE — 76641 ULTRASOUND BREAST COMPLETE: CPT | Mod: 26,50

## 2023-11-24 PROCEDURE — 76641 ULTRASOUND BREAST COMPLETE: CPT

## 2023-11-24 PROCEDURE — 77063 BREAST TOMOSYNTHESIS BI: CPT | Mod: 26

## 2023-11-24 PROCEDURE — 77067 SCR MAMMO BI INCL CAD: CPT | Mod: 26

## 2023-11-24 PROCEDURE — 77067 SCR MAMMO BI INCL CAD: CPT

## 2023-11-24 PROCEDURE — 77063 BREAST TOMOSYNTHESIS BI: CPT

## 2023-11-30 ENCOUNTER — APPOINTMENT (OUTPATIENT)
Dept: OBGYN | Facility: CLINIC | Age: 43
End: 2023-11-30

## 2023-12-14 NOTE — ED PROVIDER NOTE - CPE EDP CARDIAC NORM
[facility]  - Ans Serv [phone], Office [phone]  Dr Mantilla/Dr Motta /Dr Onesimo armijo /Dr INOCENCIO Davila/Dr Blayne Mathew/Dr Brian Monzon /Dr ETHAN Edward  _______________________________________________________________________________________________  The patient seen and examined today.  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 The nephrology team was consulted for management of dialysis. The patient was h\on peritoneal dialysis until 1/2023 at which time she was transitioned to HD. She still has her PD catheter in place and was at [place] [facility] to have the catheter flushed which was unsuccessful. She presented for her scheduled HD yesterday when she started to have abdominal pain and treatment was terminated early.        PAST MEDICAL & SURGICAL HISTORY:  ITP (idiopathic thrombocytopenic purpura)      Chronic renal insufficiency      ESRD (end stage renal disease)      H/O total hysterectomy      S/P hysterectomy        Allergies :- Blueberries (Unknown)  Shrimp (Hives)  [place] (Stomach Upset)  penicillin (Hives)    Home Medications Reviewed  Hospital Medications:   MEDICATIONS  (STANDING):  aspirin 325 milliGRAM(s) Oral daily  atorvastatin 10 milliGRAM(s) Oral at bedtime  cefepime   IVPB 1000 milliGRAM(s) IV Intermittent every 24 hours  clopidogrel Tablet 75 milliGRAM(s) Oral daily  lacosamide 150 milliGRAM(s) Oral two times a day  levETIRAcetam 500 milliGRAM(s) Oral two times a day  pantoprazole    Tablet 40 milliGRAM(s) Oral two times a day  sevelamer carbonate 800 milliGRAM(s) Oral three times a day with meals    SOCIAL HISTORY:  Denies ETOh,Smoking,   FAMILY HISTORY:      REVIEW OF SYSTEMS:  All negative except as mentioned above.   VITALS:  T(F): 98.1 (12-14-23 @ 11:40), Max: 100.2 (12-13-23 @ 21:31)  HR: 109 (12-14-23 @ 11:40)  BP: 101/67 (12-14-23 @ 11:40)  RR: 18 (12-14-23 @ 11:40)  SpO2: 90% (12-14-23 @ 11:40)  Wt(kg): --      Weight (kg): 85 (12-14 @ 00:09)    PHYSICAL EXAM:  Constitutional: NAD  HEENT: anicteric sclera, oropharynx clear, MMM  Neck: supple.   Respiratory: Bilateral equal breath sounds , no wheezes, no crackles  Cardiovascular: S1, S2, Regular, Murmur present.  Gastrointestinal: tenderness to palpation, PD catheter in place  Extremities: trace peripheral edema  Neurological: Alert and oriented x 3  Psychiatric: Normal mood, normal affect  Vascular Access; RIJ tunneled HD catheter    Data:  12-14    137  |  101  |  40<H>  ----------------------------<  81  5.4<H>   |  21<L>  |  9.43<H>    Ca    7.8<L>      14 Dec 2023 04:30  Phos  2.3     12-14  Mg     1.7     12-14    TPro  6.5  /  Alb  3.2<L>  /  TBili  0.3  /  DBili      /  AST  13  /  ALT  11  /  AlkPhos  111  12-14    Creatinine Trend: 9.43 <--, 9.37 <--                        9.8    24.10 )-----------( 386      ( 14 Dec 2023 04:30 )             28.5     Urine Studies:  Urinalysis Basic - ( 14 Dec 2023 04:30 )    Color:  / Appearance:  / SG:  / pH:   Gluc: 81 mg/dL / Ketone:   / Bili:  / Urobili:    Blood:  / Protein:  / Nitrite:    Leuk Esterase:  / RBC:  / WBC    Sq Epi:  / Non Sq Epi:  / Bacteria:        [facility]  - Ans Serv [phone], Office [phone]  Dr Mantilla/Dr Motta /Dr Onesimo armijo /Dr INOCENCIO Davila/Dr Blayne Mathew/Dr Brian Monzon /Dr ETHAN Edward  _______________________________________________________________________________________________  The patient seen and examined today.  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 The nephrology team was consulted for management of dialysis. The patient was h\on peritoneal dialysis until 1/2023 at which time she was transitioned to HD. She still has her PD catheter in place and was at [place] [facility] to have the catheter flushed which was unsuccessful. She presented for her scheduled HD yesterday when she started to have abdominal pain and treatment was terminated early.        PAST MEDICAL & SURGICAL HISTORY:  ITP (idiopathic thrombocytopenic purpura)      Chronic renal insufficiency      ESRD (end stage renal disease)      H/O total hysterectomy      S/P hysterectomy        Allergies :- Blueberries (Unknown)  Shrimp (Hives)  [place] (Stomach Upset)  penicillin (Hives)    Home Medications Reviewed  Hospital Medications:   MEDICATIONS  (STANDING):  aspirin 325 milliGRAM(s) Oral daily  atorvastatin 10 milliGRAM(s) Oral at bedtime  cefepime   IVPB 1000 milliGRAM(s) IV Intermittent every 24 hours  clopidogrel Tablet 75 milliGRAM(s) Oral daily  lacosamide 150 milliGRAM(s) Oral two times a day  levETIRAcetam 500 milliGRAM(s) Oral two times a day  pantoprazole    Tablet 40 milliGRAM(s) Oral two times a day  sevelamer carbonate 800 milliGRAM(s) Oral three times a day with meals    SOCIAL HISTORY:  Denies ETOh,Smoking,   FAMILY HISTORY:      REVIEW OF SYSTEMS:  All negative except as mentioned above.   VITALS:  T(F): 98.1 (12-14-23 @ 11:40), Max: 100.2 (12-13-23 @ 21:31)  HR: 109 (12-14-23 @ 11:40)  BP: 101/67 (12-14-23 @ 11:40)  RR: 18 (12-14-23 @ 11:40)  SpO2: 90% (12-14-23 @ 11:40)  Wt(kg): --      Weight (kg): 85 (12-14 @ 00:09)    PHYSICAL EXAM:  Constitutional: NAD  HEENT: anicteric sclera, oropharynx clear, MMM  Neck: supple.   Respiratory: Bilateral equal breath sounds , no wheezes, no crackles  Cardiovascular: S1, S2, Regular, Murmur present.  Gastrointestinal: tenderness to palpation, PD catheter in place  Extremities: trace peripheral edema  Neurological: Alert and oriented x 3  Psychiatric: Normal mood, normal affect  Vascular Access; RIJ tunneled HD catheter    Data:  12-14    137  |  101  |  40<H>  ----------------------------<  81  5.4<H>   |  21<L>  |  9.43<H>    Ca    7.8<L>      14 Dec 2023 04:30  Phos  2.3     12-14  Mg     1.7     12-14    TPro  6.5  /  Alb  3.2<L>  /  TBili  0.3  /  DBili      /  AST  13  /  ALT  11  /  AlkPhos  111  12-14    Creatinine Trend: 9.43 <--, 9.37 <--                        9.8    24.10 )-----------( 386      ( 14 Dec 2023 04:30 )             28.5     Urine Studies:  Urinalysis Basic - ( 14 Dec 2023 04:30 )    Color:  / Appearance:  / SG:  / pH:   Gluc: 81 mg/dL / Ketone:   / Bili:  / Urobili:    Blood:  / Protein:  / Nitrite:    Leuk Esterase:  / RBC:  / WBC    Sq Epi:  / Non Sq Epi:  / Bacteria:        normal...

## 2023-12-29 ENCOUNTER — EMERGENCY (EMERGENCY)
Facility: HOSPITAL | Age: 43
LOS: 1 days | Discharge: HOME | End: 2023-12-29
Attending: EMERGENCY MEDICINE | Admitting: STUDENT IN AN ORGANIZED HEALTH CARE EDUCATION/TRAINING PROGRAM
Payer: COMMERCIAL

## 2023-12-29 VITALS
DIASTOLIC BLOOD PRESSURE: 82 MMHG | TEMPERATURE: 98 F | SYSTOLIC BLOOD PRESSURE: 127 MMHG | WEIGHT: 179.9 LBS | HEART RATE: 86 BPM | HEIGHT: 64 IN | RESPIRATION RATE: 19 BRPM | OXYGEN SATURATION: 97 %

## 2023-12-29 VITALS
DIASTOLIC BLOOD PRESSURE: 80 MMHG | SYSTOLIC BLOOD PRESSURE: 130 MMHG | OXYGEN SATURATION: 98 % | HEART RATE: 88 BPM | RESPIRATION RATE: 18 BRPM

## 2023-12-29 LAB
ALBUMIN SERPL ELPH-MCNC: 3.3 G/DL — SIGNIFICANT CHANGE UP (ref 3.3–5)
ALBUMIN SERPL ELPH-MCNC: 3.3 G/DL — SIGNIFICANT CHANGE UP (ref 3.3–5)
ALP SERPL-CCNC: 51 U/L — SIGNIFICANT CHANGE UP (ref 40–120)
ALP SERPL-CCNC: 51 U/L — SIGNIFICANT CHANGE UP (ref 40–120)
ALT FLD-CCNC: 39 U/L — SIGNIFICANT CHANGE UP (ref 10–45)
ALT FLD-CCNC: 39 U/L — SIGNIFICANT CHANGE UP (ref 10–45)
ANION GAP SERPL CALC-SCNC: 6 MMOL/L — SIGNIFICANT CHANGE UP (ref 5–17)
ANION GAP SERPL CALC-SCNC: 6 MMOL/L — SIGNIFICANT CHANGE UP (ref 5–17)
APPEARANCE UR: ABNORMAL
APPEARANCE UR: ABNORMAL
AST SERPL-CCNC: 41 U/L — HIGH (ref 10–40)
AST SERPL-CCNC: 41 U/L — HIGH (ref 10–40)
BACTERIA # UR AUTO: NEGATIVE /HPF — SIGNIFICANT CHANGE UP
BACTERIA # UR AUTO: NEGATIVE /HPF — SIGNIFICANT CHANGE UP
BASOPHILS # BLD AUTO: 0.01 K/UL — SIGNIFICANT CHANGE UP (ref 0–0.2)
BASOPHILS # BLD AUTO: 0.01 K/UL — SIGNIFICANT CHANGE UP (ref 0–0.2)
BASOPHILS NFR BLD AUTO: 0.2 % — SIGNIFICANT CHANGE UP (ref 0–2)
BASOPHILS NFR BLD AUTO: 0.2 % — SIGNIFICANT CHANGE UP (ref 0–2)
BILIRUB SERPL-MCNC: 0.5 MG/DL — SIGNIFICANT CHANGE UP (ref 0.2–1.2)
BILIRUB SERPL-MCNC: 0.5 MG/DL — SIGNIFICANT CHANGE UP (ref 0.2–1.2)
BILIRUB UR-MCNC: NEGATIVE — SIGNIFICANT CHANGE UP
BILIRUB UR-MCNC: NEGATIVE — SIGNIFICANT CHANGE UP
BUN SERPL-MCNC: 11 MG/DL — SIGNIFICANT CHANGE UP (ref 7–23)
BUN SERPL-MCNC: 11 MG/DL — SIGNIFICANT CHANGE UP (ref 7–23)
CALCIUM SERPL-MCNC: 8.7 MG/DL — SIGNIFICANT CHANGE UP (ref 8.4–10.5)
CALCIUM SERPL-MCNC: 8.7 MG/DL — SIGNIFICANT CHANGE UP (ref 8.4–10.5)
CHLORIDE SERPL-SCNC: 104 MMOL/L — SIGNIFICANT CHANGE UP (ref 96–108)
CHLORIDE SERPL-SCNC: 104 MMOL/L — SIGNIFICANT CHANGE UP (ref 96–108)
CO2 SERPL-SCNC: 28 MMOL/L — SIGNIFICANT CHANGE UP (ref 22–31)
CO2 SERPL-SCNC: 28 MMOL/L — SIGNIFICANT CHANGE UP (ref 22–31)
COLOR SPEC: YELLOW — SIGNIFICANT CHANGE UP
COLOR SPEC: YELLOW — SIGNIFICANT CHANGE UP
CREAT SERPL-MCNC: 0.7 MG/DL — SIGNIFICANT CHANGE UP (ref 0.5–1.3)
CREAT SERPL-MCNC: 0.7 MG/DL — SIGNIFICANT CHANGE UP (ref 0.5–1.3)
DIFF PNL FLD: NEGATIVE — SIGNIFICANT CHANGE UP
DIFF PNL FLD: NEGATIVE — SIGNIFICANT CHANGE UP
EGFR: 110 ML/MIN/1.73M2 — SIGNIFICANT CHANGE UP
EGFR: 110 ML/MIN/1.73M2 — SIGNIFICANT CHANGE UP
EOSINOPHIL # BLD AUTO: 0.05 K/UL — SIGNIFICANT CHANGE UP (ref 0–0.5)
EOSINOPHIL # BLD AUTO: 0.05 K/UL — SIGNIFICANT CHANGE UP (ref 0–0.5)
EOSINOPHIL NFR BLD AUTO: 1 % — SIGNIFICANT CHANGE UP (ref 0–6)
EOSINOPHIL NFR BLD AUTO: 1 % — SIGNIFICANT CHANGE UP (ref 0–6)
EPI CELLS # UR: 10 — SIGNIFICANT CHANGE UP
EPI CELLS # UR: 10 — SIGNIFICANT CHANGE UP
GLUCOSE SERPL-MCNC: 86 MG/DL — SIGNIFICANT CHANGE UP (ref 70–99)
GLUCOSE SERPL-MCNC: 86 MG/DL — SIGNIFICANT CHANGE UP (ref 70–99)
GLUCOSE UR QL: NEGATIVE MG/DL — SIGNIFICANT CHANGE UP
GLUCOSE UR QL: NEGATIVE MG/DL — SIGNIFICANT CHANGE UP
HCG SERPL-ACNC: <1 MIU/ML — SIGNIFICANT CHANGE UP
HCG SERPL-ACNC: <1 MIU/ML — SIGNIFICANT CHANGE UP
HCT VFR BLD CALC: 38.3 % — SIGNIFICANT CHANGE UP (ref 34.5–45)
HCT VFR BLD CALC: 38.3 % — SIGNIFICANT CHANGE UP (ref 34.5–45)
HGB BLD-MCNC: 12.6 G/DL — SIGNIFICANT CHANGE UP (ref 11.5–15.5)
HGB BLD-MCNC: 12.6 G/DL — SIGNIFICANT CHANGE UP (ref 11.5–15.5)
IMM GRANULOCYTES NFR BLD AUTO: 0.2 % — SIGNIFICANT CHANGE UP (ref 0–0.9)
IMM GRANULOCYTES NFR BLD AUTO: 0.2 % — SIGNIFICANT CHANGE UP (ref 0–0.9)
KETONES UR-MCNC: NEGATIVE MG/DL — SIGNIFICANT CHANGE UP
KETONES UR-MCNC: NEGATIVE MG/DL — SIGNIFICANT CHANGE UP
LEUKOCYTE ESTERASE UR-ACNC: ABNORMAL
LEUKOCYTE ESTERASE UR-ACNC: ABNORMAL
LIDOCAIN IGE QN: 27 U/L — SIGNIFICANT CHANGE UP (ref 16–77)
LIDOCAIN IGE QN: 27 U/L — SIGNIFICANT CHANGE UP (ref 16–77)
LYMPHOCYTES # BLD AUTO: 1.55 K/UL — SIGNIFICANT CHANGE UP (ref 1–3.3)
LYMPHOCYTES # BLD AUTO: 1.55 K/UL — SIGNIFICANT CHANGE UP (ref 1–3.3)
LYMPHOCYTES # BLD AUTO: 30.5 % — SIGNIFICANT CHANGE UP (ref 13–44)
LYMPHOCYTES # BLD AUTO: 30.5 % — SIGNIFICANT CHANGE UP (ref 13–44)
MCHC RBC-ENTMCNC: 28.8 PG — SIGNIFICANT CHANGE UP (ref 27–34)
MCHC RBC-ENTMCNC: 28.8 PG — SIGNIFICANT CHANGE UP (ref 27–34)
MCHC RBC-ENTMCNC: 32.9 GM/DL — SIGNIFICANT CHANGE UP (ref 32–36)
MCHC RBC-ENTMCNC: 32.9 GM/DL — SIGNIFICANT CHANGE UP (ref 32–36)
MCV RBC AUTO: 87.6 FL — SIGNIFICANT CHANGE UP (ref 80–100)
MCV RBC AUTO: 87.6 FL — SIGNIFICANT CHANGE UP (ref 80–100)
MONOCYTES # BLD AUTO: 0.7 K/UL — SIGNIFICANT CHANGE UP (ref 0–0.9)
MONOCYTES # BLD AUTO: 0.7 K/UL — SIGNIFICANT CHANGE UP (ref 0–0.9)
MONOCYTES NFR BLD AUTO: 13.8 % — SIGNIFICANT CHANGE UP (ref 2–14)
MONOCYTES NFR BLD AUTO: 13.8 % — SIGNIFICANT CHANGE UP (ref 2–14)
NEUTROPHILS # BLD AUTO: 2.76 K/UL — SIGNIFICANT CHANGE UP (ref 1.8–7.4)
NEUTROPHILS # BLD AUTO: 2.76 K/UL — SIGNIFICANT CHANGE UP (ref 1.8–7.4)
NEUTROPHILS NFR BLD AUTO: 54.3 % — SIGNIFICANT CHANGE UP (ref 43–77)
NEUTROPHILS NFR BLD AUTO: 54.3 % — SIGNIFICANT CHANGE UP (ref 43–77)
NITRITE UR-MCNC: NEGATIVE — SIGNIFICANT CHANGE UP
NITRITE UR-MCNC: NEGATIVE — SIGNIFICANT CHANGE UP
NRBC # BLD: 0 /100 WBCS — SIGNIFICANT CHANGE UP (ref 0–0)
NRBC # BLD: 0 /100 WBCS — SIGNIFICANT CHANGE UP (ref 0–0)
PH UR: 7 — SIGNIFICANT CHANGE UP (ref 5–8)
PH UR: 7 — SIGNIFICANT CHANGE UP (ref 5–8)
PLATELET # BLD AUTO: 273 K/UL — SIGNIFICANT CHANGE UP (ref 150–400)
PLATELET # BLD AUTO: 273 K/UL — SIGNIFICANT CHANGE UP (ref 150–400)
POTASSIUM SERPL-MCNC: 4.1 MMOL/L — SIGNIFICANT CHANGE UP (ref 3.5–5.3)
POTASSIUM SERPL-MCNC: 4.1 MMOL/L — SIGNIFICANT CHANGE UP (ref 3.5–5.3)
POTASSIUM SERPL-SCNC: 4.1 MMOL/L — SIGNIFICANT CHANGE UP (ref 3.5–5.3)
POTASSIUM SERPL-SCNC: 4.1 MMOL/L — SIGNIFICANT CHANGE UP (ref 3.5–5.3)
PROT SERPL-MCNC: 8.1 G/DL — SIGNIFICANT CHANGE UP (ref 6–8.3)
PROT SERPL-MCNC: 8.1 G/DL — SIGNIFICANT CHANGE UP (ref 6–8.3)
PROT UR-MCNC: NEGATIVE MG/DL — SIGNIFICANT CHANGE UP
PROT UR-MCNC: NEGATIVE MG/DL — SIGNIFICANT CHANGE UP
RBC # BLD: 4.37 M/UL — SIGNIFICANT CHANGE UP (ref 3.8–5.2)
RBC # BLD: 4.37 M/UL — SIGNIFICANT CHANGE UP (ref 3.8–5.2)
RBC # FLD: 13.1 % — SIGNIFICANT CHANGE UP (ref 10.3–14.5)
RBC # FLD: 13.1 % — SIGNIFICANT CHANGE UP (ref 10.3–14.5)
RBC CASTS # UR COMP ASSIST: 0 /HPF — SIGNIFICANT CHANGE UP (ref 0–4)
RBC CASTS # UR COMP ASSIST: 0 /HPF — SIGNIFICANT CHANGE UP (ref 0–4)
SODIUM SERPL-SCNC: 138 MMOL/L — SIGNIFICANT CHANGE UP (ref 135–145)
SODIUM SERPL-SCNC: 138 MMOL/L — SIGNIFICANT CHANGE UP (ref 135–145)
SP GR SPEC: 1.02 — SIGNIFICANT CHANGE UP (ref 1–1.03)
SP GR SPEC: 1.02 — SIGNIFICANT CHANGE UP (ref 1–1.03)
UROBILINOGEN FLD QL: 1 MG/DL — SIGNIFICANT CHANGE UP (ref 0.2–1)
UROBILINOGEN FLD QL: 1 MG/DL — SIGNIFICANT CHANGE UP (ref 0.2–1)
WBC # BLD: 5.08 K/UL — SIGNIFICANT CHANGE UP (ref 3.8–10.5)
WBC # BLD: 5.08 K/UL — SIGNIFICANT CHANGE UP (ref 3.8–10.5)
WBC # FLD AUTO: 5.08 K/UL — SIGNIFICANT CHANGE UP (ref 3.8–10.5)
WBC # FLD AUTO: 5.08 K/UL — SIGNIFICANT CHANGE UP (ref 3.8–10.5)
WBC UR QL: 5 /HPF — SIGNIFICANT CHANGE UP (ref 0–5)
WBC UR QL: 5 /HPF — SIGNIFICANT CHANGE UP (ref 0–5)

## 2023-12-29 PROCEDURE — 80053 COMPREHEN METABOLIC PANEL: CPT

## 2023-12-29 PROCEDURE — 99284 EMERGENCY DEPT VISIT MOD MDM: CPT

## 2023-12-29 PROCEDURE — 83690 ASSAY OF LIPASE: CPT

## 2023-12-29 PROCEDURE — 36415 COLL VENOUS BLD VENIPUNCTURE: CPT

## 2023-12-29 PROCEDURE — 85025 COMPLETE CBC W/AUTO DIFF WBC: CPT

## 2023-12-29 PROCEDURE — 81001 URINALYSIS AUTO W/SCOPE: CPT

## 2023-12-29 PROCEDURE — 99284 EMERGENCY DEPT VISIT MOD MDM: CPT | Mod: 25

## 2023-12-29 PROCEDURE — 84702 CHORIONIC GONADOTROPIN TEST: CPT

## 2023-12-29 PROCEDURE — 96374 THER/PROPH/DIAG INJ IV PUSH: CPT

## 2023-12-29 PROCEDURE — 96375 TX/PRO/DX INJ NEW DRUG ADDON: CPT

## 2023-12-29 RX ORDER — FAMOTIDINE 10 MG/ML
20 INJECTION INTRAVENOUS ONCE
Refills: 0 | Status: DISCONTINUED | OUTPATIENT
Start: 2023-12-29 | End: 2023-12-29

## 2023-12-29 RX ORDER — FAMOTIDINE 10 MG/ML
20 INJECTION INTRAVENOUS ONCE
Refills: 0 | Status: COMPLETED | OUTPATIENT
Start: 2023-12-29 | End: 2023-12-29

## 2023-12-29 RX ORDER — ONDANSETRON 8 MG/1
1 TABLET, FILM COATED ORAL
Qty: 8 | Refills: 0
Start: 2023-12-29 | End: 2024-01-01

## 2023-12-29 RX ORDER — ONDANSETRON 8 MG/1
4 TABLET, FILM COATED ORAL ONCE
Refills: 0 | Status: COMPLETED | OUTPATIENT
Start: 2023-12-29 | End: 2023-12-29

## 2023-12-29 RX ORDER — SODIUM CHLORIDE 9 MG/ML
1000 INJECTION INTRAMUSCULAR; INTRAVENOUS; SUBCUTANEOUS ONCE
Refills: 0 | Status: COMPLETED | OUTPATIENT
Start: 2023-12-29 | End: 2023-12-29

## 2023-12-29 RX ADMIN — FAMOTIDINE 100 MILLIGRAM(S): 10 INJECTION INTRAVENOUS at 19:15

## 2023-12-29 RX ADMIN — SODIUM CHLORIDE 1000 MILLILITER(S): 9 INJECTION INTRAMUSCULAR; INTRAVENOUS; SUBCUTANEOUS at 19:02

## 2023-12-29 RX ADMIN — ONDANSETRON 4 MILLIGRAM(S): 8 TABLET, FILM COATED ORAL at 19:14

## 2023-12-29 RX ADMIN — Medication 30 MILLILITER(S): at 19:15

## 2023-12-29 NOTE — ED ADULT TRIAGE NOTE - CHIEF COMPLAINT QUOTE
Patient c/o n/v/d since last night without fever. Patient denies chest pain, SOB, headache and fever.

## 2023-12-29 NOTE — ED ADULT NURSE NOTE - OBJECTIVE STATEMENT
43 yr old female to ED for complaints of diarrhea, nausea and vomiting. Patient states she has been having diarrhea since last night. Patient denies cough, fevers, chills, chest pain, SOB, headache.

## 2023-12-29 NOTE — ED ADULT NURSE NOTE - NSICDXPASTSURGICALHX_GEN_ALL_CORE_FT
Clinic Visit Note    Subjective:  Lala Cantu is a 45 year old female who presents for the following issues:    1. Migraines: the patient presents to clinic today for follow up of her migraines. She states lately she feels her allergies have been worse which have been triggering her migraines. Her last bad migraine was on Thursday. She did take a allergia and flonase which does not really help. She does feel ear fullness and sinus drainage. She when she gets the migraines she'll get a pressure sensation on the sides of her head and it can last hours. Sometimes she gets light sensitivity and has to stay in bed all day. She did take an imitrex a few days ago which only helped a little. She also occasionally takes the hydroxyzine. Which helps a little. She states in the past she did get a toradol injection would relieve the symptoms. She today wanted to see what other options she has to treat the symptoms. She has no other concerns today. She denies any neurologic deficits.    ALLERGIES:   Allergen Reactions   • Seasonal Other (See Comments)     Runny nose, itchy watery eyes       Current Outpatient Medications   Medication Sig Dispense Refill   • SUMAtriptan (IMITREX) 50 MG tablet Take 1 tablet by mouth as needed for Migraine. Take 1 tablet by mouth at onset of migraine. May repeat after 2 hours if needed. 10 tablet 1   • desvenlafaxine (PRISTIQ) 50 MG 24 hr tablet Take 1 tablet by mouth daily. 90 tablet 1   • zolpidem (AMBIEN CR) 12.5 MG CR tablet Take 1 tablet by mouth nightly as needed for Sleep. 90 tablet 0   • lisinopril (ZESTRIL) 10 MG tablet Take 1 tablet by mouth daily. 90 tablet 1   • lamotrigine (LAMICTAL) 200 MG tablet Take 200 mg by mouth daily.     • hydrOXYzine (ATARAX) 25 MG tablet Take 25 mg by mouth as needed for Itching.     • omeprazole (PRILOSEC) 20 MG capsule Take 20 mg by mouth daily.     • atorvastatin (LIPITOR) 20 MG tablet Take 20 mg by mouth daily.     • montelukast (SINGULAIR) 10 MG  tablet Take 1 tablet by mouth nightly. 90 tablet 1     No current facility-administered medications for this visit.           There is no immunization history on file for this patient.    Past Medical History:   Diagnosis Date   • Depressive disorder    • Migraine        Past Surgical History:   Procedure Laterality Date   • Breast enhancement surgery         •  section, low transverse     • Rotator cuff repair      left shoulder         Family History   Problem Relation Age of Onset   • Cancer Mother    • Stroke Mother    • Cancer Father    • Stroke Father    • Diabetes Father        Social History     Socioeconomic History   • Marital status: /Civil Union     Spouse name: Not on file   • Number of children: Not on file   • Years of education: Not on file   • Highest education level: Not on file   Occupational History   • Not on file   Social Needs   • Financial resource strain: Not on file   • Food insecurity:     Worry: Not on file     Inability: Not on file   • Transportation needs:     Medical: Not on file     Non-medical: Not on file   Tobacco Use   • Smoking status: Never Smoker   • Smokeless tobacco: Never Used   Substance and Sexual Activity   • Alcohol use: Not on file   • Drug use: Not on file   • Sexual activity: Not on file   Lifestyle   • Physical activity:     Days per week: Not on file     Minutes per session: Not on file   • Stress: Not on file   Relationships   • Social connections:     Talks on phone: Not on file     Gets together: Not on file     Attends [employer] service: Not on file     Active member of club or organization: Not on file     Attends meetings of clubs or organizations: Not on file     Relationship status: Not on file   • Intimate partner violence:     Fear of current or ex partner: Not on file     Emotionally abused: Not on file     Physically abused: Not on file     Forced sexual activity: Not on file   Other Topics Concern   • Not on file   Social History  Narrative   • Not on file        REVIEW OF SYSTEMS:   Eye Problem(s): occasional light sensitivity due to migraines.   ENT Problem(s): nasal drainage and ear pressure  Cardiovascular problem(s): negative  Respiratory problem(s): negative  Neurological problem(s): migraines.     Objective:  Visit Vitals  /68 (BP Location: [facility] [place], Patient Position: Sitting)   Pulse 74   Ht 5' 4\" (1.626 m)   Wt 80 kg   SpO2 96%   BMI 30.28 kg/m²     BODY MASS INDEX: Body mass index is 30.28 kg/m².    PHYSICAL EXAMINATION:    Examination of the patient reveals:   GENERAL: appears stated age and well developed and well nourished  SKIN normal color, normal texture and normal turgor  HEAD: normocephalic  EYES: pupils are equal and reactive to light and accommodation extraocular movements are full sclerae and conjunctivae are normal lids and lashes are normal  EARS: pinna and external ear is normal bilaterally, external auditory canals are normal, tympanic membranes are normal, middle ear space is normal bilaterally, there is no discomfort on traction of the pinna or palpation of the tragus and auditory acuity is grossly normal  NOSE: external nose is normal to inspection  MOUTH/THROAT: tongue is midline and appears normal, oropharynx appears normal, soft palate and uvula are normal and oral mucosa is normal  NECK: neck is supple, no anterior cervical adenopathy, no posterior cervical adenopathy and no supraclavicular adenopathy  HEART: normal rate and rhythm, no murmurs and no extra heart sounds  NEUROLOGIC: cranial nerves 2 through 12 grossly normal, coordination normal and no tremor noted  LUNGS: Normal respiratory effort. lungs are clear to auscultation with normal inspiratory/expiratory sounds, no rales, no rhonchi and no wheezes  PSYCHIATRIC: normal affect and judgement     Lala was seen today for fatigue and migraine.    Diagnoses and all orders for this visit:    Other migraine without status migrainosus, not intractable: the  patient presents to clinic today for follow up of her migraines. Lately they have been worse due to the season change. In clinic today her exam overall was normal. Vitals stable. Today she states the migraine symptoms are better. We did review treatment options. Since her migraines seem to be triggered by uncontrolled allergies we reviewed trial of Singulair. She agreed. I also offered a chronic treatment for migraines and she wanted to try the Singulair first. She did want to see neurology as well since this is a chronic issue. This order is placed. Also I did refill her imitrex. We also provided a 30mg injection of toradol, because this helped in the past. She has done well with this medication before. Otherwise she had no other acute concerns today. She'll all if symptoms do not improve.  We did review concerning symptoms and when to seek medical attentions sooner. She again expressed understanding and agreed to this plan.   -     SUMAtriptan (IMITREX) 50 MG tablet; Take 1 tablet by mouth as needed for Migraine. Take 1 tablet by mouth at onset of migraine. May repeat after 2 hours if needed.  -     montelukast (SINGULAIR) 10 MG tablet; Take 1 tablet by mouth nightly.  -     SERVICE TO NEUROLOGY      Health Care Maintenance: prn  Breast cancer screening  -     MAMMO SCREENING BILATERAL; Future    FOLLOW UP:  Pending above     PAST SURGICAL HISTORY:  No significant past surgical history

## 2023-12-29 NOTE — ED PROVIDER NOTE - OBJECTIVE STATEMENT
43-year-old female without any significant past medical conditions chief complaint of feeling nausea vomiting inability tolerate p.o.  Patient states she has been feeling nausea for the past couple of days and having difficulty with keeping food down.  Patient notes that she did not have a anybody around her have similar symptoms but notes that her  is sick with a fever for 1 to 2 days.

## 2023-12-29 NOTE — ED ADULT NURSE NOTE - NSFALLUNIVINTERV_ED_ALL_ED
Bed/Stretcher in lowest position, wheels locked, appropriate side rails in place/Call bell, personal items and telephone in reach/Instruct patient to call for assistance before getting out of bed/chair/stretcher/Non-slip footwear applied when patient is off stretcher/[place] to call system/Physically safe environment - no spills, clutter or unnecessary equipment/Purposeful proactive rounding/Room/bathroom lighting operational, light cord in reach Bed/Stretcher in lowest position, wheels locked, appropriate side rails in place/Call bell, personal items and telephone in reach/Instruct patient to call for assistance before getting out of bed/chair/stretcher/Non-slip footwear applied when patient is off stretcher/[place] to call system/Physically safe environment - no spills, clutter or unnecessary equipment/Purposeful proactive rounding/Room/bathroom lighting operational, light cord in reach

## 2023-12-29 NOTE — ED PROVIDER NOTE - PHYSICAL EXAMINATION
GENERAL: Awake, alert, NAD  ABDOMEN: Soft, non tender, non distended, no rebound, no guarding  BACK: No midline spinal tenderness, no CVA tenderness  EXT: No edema, no calf tenderness, no deformities.  NEURO: A&Ox3. Moving all extremities.  SKIN: Warm and dry. No rash.  PSYCH: Normal affect.

## 2023-12-29 NOTE — ED PROVIDER NOTE - PROGRESS NOTE DETAILS
Bety PGY 3 Patient tolerating p.o. at bedside  States that she feels better will recommend DC at this point recommending some Zofran for home

## 2023-12-29 NOTE — ED PROVIDER NOTE - CLINICAL SUMMARY MEDICAL DECISION MAKING FREE TEXT BOX
Given history and physical and benign abdomen no clinical concern at this time for any acute abdominal pathology will obtain basic blood work in conjunction with urinalysis.

## 2023-12-29 NOTE — ED PROVIDER NOTE - ATTENDING CONTRIBUTION TO CARE
This was shared visit with resident. I have reviewed and verified the documentation and independently performed the documented history, exam and mdm  43-year-old female without any significant past medical conditions chief complaint of feeling nausea vomiting inability tolerate p.o.  Patient states she has been feeling nausea for the past couple of days and having difficulty with keeping food down.  Patient notes that she did not have a anybody around her have similar symptoms but notes that her  is sick with a fever for 1 to 2 days.

## 2023-12-29 NOTE — ED ADULT NURSE NOTE - NS PRO PASSIVE SMOKE EXP
[FreeTextEntry1] : I was present with the NP/PA during the history and exam of the patient. I discussed patient’s management in details. I reviewed the NP’s note and agree with the documented findings and plan of care. Please do not hesitate to contact me if you have any further questions at [phone]. \par \par \par  No

## 2023-12-29 NOTE — ED PROVIDER NOTE - PATIENT PORTAL LINK FT
You can access the FollowMyHealth Patient Portal offered by [facility] by registering at the following website: [email or web address]. By joining [employer]’s FollowMyHealth portal, you will also be able to view your health information using other applications (apps) compatible with our system. You can access the FollowMyHealth Patient Portal offered by [facility] by registering at the following website: [email or web address]. By joining [employer]’s FollowMyHealth portal, you will also be able to view your health information using other applications (apps) compatible with our system.

## 2023-12-29 NOTE — ED PROVIDER NOTE - NS ED ATTENDING STATEMENT MOD
I have seen and examined this patient and fully participated in the care of this patient as the teaching attending.  The service was shared with the RIGO.  I reviewed and verified the documentation and independently performed the documented:

## 2024-01-04 ENCOUNTER — OUTPATIENT (OUTPATIENT)
Dept: OUTPATIENT SERVICES | Facility: HOSPITAL | Age: 44
LOS: 1 days | End: 2024-01-04
Payer: COMMERCIAL

## 2024-01-04 ENCOUNTER — APPOINTMENT (OUTPATIENT)
Dept: ULTRASOUND IMAGING | Facility: HOSPITAL | Age: 44
End: 2024-01-04
Payer: COMMERCIAL

## 2024-01-04 DIAGNOSIS — R11.2 NAUSEA WITH VOMITING, UNSPECIFIED: ICD-10-CM

## 2024-01-04 DIAGNOSIS — R10.2 PELVIC AND PERINEAL PAIN: ICD-10-CM

## 2024-01-04 PROCEDURE — 76700 US EXAM ABDOM COMPLETE: CPT

## 2024-01-04 PROCEDURE — 76700 US EXAM ABDOM COMPLETE: CPT | Mod: 26

## 2024-01-17 NOTE — PATIENT PROFILE OB - NSTOBACCONEVERSMOKERY/N_GEN_A
-- DO NOT REPLY / DO NOT REPLY ALL --  -- Message is from Engagement Center Operations (ECO) --    ONLY TO BE USED WITHIN A REFILL MEDICATION ENCOUNTER    Med Refill  Is the patient currently having any symptoms?: No/Non-Emergent symptoms    Name of medication requested: See pended med    Is this the first request for the medication in the last 48 hours?: Yes    On active list, patient is requesting a Change to pharmacy    Is this an existing medication or a new medication that is at one pharmacy and needs to be sent to another pharmacy?:  Existing Medication    New Pharmacy Requested:   [id number] [place], [place] - [address] [phone]     Full name of the provider who ordered the medication: Genesis Pedro DO  and Yaneth Trejo MD     Clinic site name / Account # for provider:     Preferred Pharmacy: [id number] [address]    Patient confirmed the above pharmacy as correct?  Yes    Caller Information         Type Contact Phone/Fax    01/17/2024 12:58 PM CST Phone (Incoming) Yolanda Grant (Self) [phone] (M)            Alternative phone number:   Can a detailed message be left?: Yes         Yes

## 2024-01-19 DIAGNOSIS — R11.2 NAUSEA WITH VOMITING, UNSPECIFIED: ICD-10-CM

## 2024-01-19 DIAGNOSIS — K76.0 FATTY (CHANGE OF) LIVER, NOT ELSEWHERE CLASSIFIED: ICD-10-CM

## 2024-01-19 DIAGNOSIS — R10.2 PELVIC AND PERINEAL PAIN: ICD-10-CM

## 2024-01-19 DIAGNOSIS — R10.9 UNSPECIFIED ABDOMINAL PAIN: ICD-10-CM

## 2024-04-24 NOTE — ED PROVIDER NOTE - CROS ED GU ALL NEG
Refill Routing Note   Medication(s) are not appropriate for processing by [facility] for the following reason(s):        Outside of protocol    [facility] action(s):  Route               Appointments  past 12m or future 3m with PCP    Date Provider   Last Visit   4/2/2024 Anita Luna MD   Next Visit   Visit date not found Anita Luna MD   ED visits in past 90 days: 0        Note composed:6:46 AM 04/24/2024          
negative...

## 2024-06-18 ENCOUNTER — OUTPATIENT (OUTPATIENT)
Dept: OUTPATIENT SERVICES | Facility: HOSPITAL | Age: 44
LOS: 1 days | End: 2024-06-18
Payer: COMMERCIAL

## 2024-06-18 VITALS
SYSTOLIC BLOOD PRESSURE: 126 MMHG | RESPIRATION RATE: 12 BRPM | HEIGHT: 64 IN | WEIGHT: 173.06 LBS | TEMPERATURE: 99 F | OXYGEN SATURATION: 99 % | DIASTOLIC BLOOD PRESSURE: 87 MMHG | HEART RATE: 76 BPM

## 2024-06-18 DIAGNOSIS — N39.3 STRESS INCONTINENCE (FEMALE) (MALE): ICD-10-CM

## 2024-06-18 DIAGNOSIS — N36.42 INTRINSIC SPHINCTER DEFICIENCY (ISD): ICD-10-CM

## 2024-06-18 DIAGNOSIS — R63.4 ABNORMAL WEIGHT LOSS: ICD-10-CM

## 2024-06-18 DIAGNOSIS — Z01.818 ENCOUNTER FOR OTHER PREPROCEDURAL EXAMINATION: ICD-10-CM

## 2024-06-18 LAB
ANION GAP SERPL CALC-SCNC: 15 MMOL/L — SIGNIFICANT CHANGE UP (ref 5–17)
BUN SERPL-MCNC: 11 MG/DL — SIGNIFICANT CHANGE UP (ref 7–23)
CALCIUM SERPL-MCNC: 9.6 MG/DL — SIGNIFICANT CHANGE UP (ref 8.4–10.5)
CHLORIDE SERPL-SCNC: 103 MMOL/L — SIGNIFICANT CHANGE UP (ref 96–108)
CO2 SERPL-SCNC: 21 MMOL/L — LOW (ref 22–31)
CREAT SERPL-MCNC: 0.69 MG/DL — SIGNIFICANT CHANGE UP (ref 0.5–1.3)
EGFR: 110 ML/MIN/1.73M2 — SIGNIFICANT CHANGE UP
GLUCOSE SERPL-MCNC: 85 MG/DL — SIGNIFICANT CHANGE UP (ref 70–99)
HCT VFR BLD CALC: 35.7 % — SIGNIFICANT CHANGE UP (ref 34.5–45)
HGB BLD-MCNC: 12.1 G/DL — SIGNIFICANT CHANGE UP (ref 11.5–15.5)
MCHC RBC-ENTMCNC: 28.7 PG — SIGNIFICANT CHANGE UP (ref 27–34)
MCHC RBC-ENTMCNC: 33.9 GM/DL — SIGNIFICANT CHANGE UP (ref 32–36)
MCV RBC AUTO: 84.8 FL — SIGNIFICANT CHANGE UP (ref 80–100)
NRBC # BLD: 0 /100 WBCS — SIGNIFICANT CHANGE UP (ref 0–0)
PLATELET # BLD AUTO: 275 K/UL — SIGNIFICANT CHANGE UP (ref 150–400)
POTASSIUM SERPL-MCNC: 3.7 MMOL/L — SIGNIFICANT CHANGE UP (ref 3.5–5.3)
POTASSIUM SERPL-SCNC: 3.7 MMOL/L — SIGNIFICANT CHANGE UP (ref 3.5–5.3)
RBC # BLD: 4.21 M/UL — SIGNIFICANT CHANGE UP (ref 3.8–5.2)
RBC # FLD: 12.9 % — SIGNIFICANT CHANGE UP (ref 10.3–14.5)
SODIUM SERPL-SCNC: 139 MMOL/L — SIGNIFICANT CHANGE UP (ref 135–145)
WBC # BLD: 6.78 K/UL — SIGNIFICANT CHANGE UP (ref 3.8–10.5)
WBC # FLD AUTO: 6.78 K/UL — SIGNIFICANT CHANGE UP (ref 3.8–10.5)

## 2024-06-18 PROCEDURE — 85027 COMPLETE CBC AUTOMATED: CPT

## 2024-06-18 PROCEDURE — G0463: CPT

## 2024-06-18 PROCEDURE — 87086 URINE CULTURE/COLONY COUNT: CPT

## 2024-06-18 PROCEDURE — 80048 BASIC METABOLIC PNL TOTAL CA: CPT

## 2024-06-18 RX ORDER — DEXTROSE MONOHYDRATE AND SODIUM CHLORIDE 5; .3 G/100ML; G/100ML
1000 INJECTION, SOLUTION INTRAVENOUS
Refills: 0 | Status: DISCONTINUED | OUTPATIENT
Start: 2024-06-26 | End: 2024-07-11

## 2024-06-18 RX ORDER — SODIUM CHLORIDE 0.9 % (FLUSH) 0.9 %
3 SYRINGE (ML) INJECTION EVERY 8 HOURS
Refills: 0 | Status: DISCONTINUED | OUTPATIENT
Start: 2024-06-26 | End: 2024-07-11

## 2024-06-18 RX ORDER — SERTRALINE 25 MG/1
0 TABLET, FILM COATED ORAL
Qty: 0 | Refills: 0 | DISCHARGE

## 2024-06-20 LAB
CULTURE RESULTS: SIGNIFICANT CHANGE UP
SPECIMEN SOURCE: SIGNIFICANT CHANGE UP

## 2024-06-24 ENCOUNTER — TRANSCRIPTION ENCOUNTER (OUTPATIENT)
Age: 44
End: 2024-06-24

## 2024-06-25 ENCOUNTER — TRANSCRIPTION ENCOUNTER (OUTPATIENT)
Age: 44
End: 2024-06-25

## 2024-06-26 ENCOUNTER — OUTPATIENT (OUTPATIENT)
Dept: OUTPATIENT SERVICES | Facility: HOSPITAL | Age: 44
LOS: 1 days | End: 2024-06-26
Payer: COMMERCIAL

## 2024-06-26 VITALS
HEART RATE: 66 BPM | SYSTOLIC BLOOD PRESSURE: 151 MMHG | OXYGEN SATURATION: 96 % | RESPIRATION RATE: 16 BRPM | DIASTOLIC BLOOD PRESSURE: 83 MMHG

## 2024-06-26 VITALS
OXYGEN SATURATION: 99 % | HEART RATE: 68 BPM | TEMPERATURE: 97 F | DIASTOLIC BLOOD PRESSURE: 79 MMHG | SYSTOLIC BLOOD PRESSURE: 120 MMHG | HEIGHT: 64 IN | WEIGHT: 173.06 LBS | RESPIRATION RATE: 16 BRPM

## 2024-06-26 DIAGNOSIS — Z01.818 ENCOUNTER FOR OTHER PREPROCEDURAL EXAMINATION: ICD-10-CM

## 2024-06-26 DIAGNOSIS — N39.3 STRESS INCONTINENCE (FEMALE) (MALE): ICD-10-CM

## 2024-06-26 DIAGNOSIS — N36.42 INTRINSIC SPHINCTER DEFICIENCY (ISD): ICD-10-CM

## 2024-06-26 PROCEDURE — 99261: CPT

## 2024-06-26 PROCEDURE — 51715 ENDOSCOPIC INJECTION/IMPLANT: CPT

## 2024-06-26 DEVICE — SYS BULKAMID URETHRAL BULKING 1ML: Type: IMPLANTABLE DEVICE | Status: FUNCTIONAL

## 2024-06-26 RX ORDER — CEFAZOLIN 10 G/1
2000 INJECTION, POWDER, FOR SOLUTION INTRAVENOUS ONCE
Refills: 0 | Status: COMPLETED | OUTPATIENT
Start: 2024-06-26 | End: 2024-06-26

## 2024-06-26 RX ORDER — SERTRALINE HYDROCHLORIDE 100 MG/1
1 TABLET, FILM COATED ORAL
Refills: 0 | DISCHARGE

## 2024-06-26 RX ORDER — LIDOCAINE HYDROCHLORIDE 20 MG/ML
0.2 INJECTION, SOLUTION EPIDURAL; INFILTRATION; INTRACAUDAL; PERINEURAL ONCE
Refills: 0 | Status: COMPLETED | OUTPATIENT
Start: 2024-06-26 | End: 2024-06-26

## 2024-06-26 RX ORDER — SEMAGLUTIDE 1.34 MG/ML
1 INJECTION, SOLUTION SUBCUTANEOUS
Refills: 0 | DISCHARGE

## 2024-06-26 RX ADMIN — Medication 3 MILLILITER(S): at 13:57

## 2024-06-26 RX ADMIN — DEXTROSE MONOHYDRATE AND SODIUM CHLORIDE 100 MILLILITER(S): 5; .3 INJECTION, SOLUTION INTRAVENOUS at 13:57

## 2024-08-18 NOTE — ED ADULT NURSE NOTE - NS ED NURSE RECORD ANOTHER VITAL SIGN
Emergency Department Note      History of Present Illness     Chief Complaint  Fall    HPI  Britt Pichardo is a 85 year old female who presents to the emergency room with what appears to be a mechanical fall earlier tonight.  She states that she had gone to the bathroom successfully, was getting up and began to get her things together and started to stumble backwards and then fell and hit her head against the wall.  She states that she did not lose consciousness, she does states she has had a significant runny nose and nonspecific weakness for the last 3 days and also a very recent fall in which she was seen and had a CT scan done and was sent home for this.  She denies any numbness or tingling to any of her extremities, no motor weakness.  Denies any chest pain or vomiting, no diarrhea, no dysuria or medication changes      Independent Historian  No    Review of External Notes  Yes I have reviewed ER note from 2 days ago with the patient was seen here for similar head injury.      Past Medical History   Medical History and Problem List  Past Medical History:   Diagnosis Date    ALS (amyotrophic lateral sclerosis) (H)     Osteoporosis     Pure hypercholesterolemia     RA (rheumatoid arthritis) (H)        Medications  calcium carbonate-vitamin D 600-200 MG-UNIT TABS  InFLIXimab (REMICADE IV)  lovastatin (MEVACOR) 20 MG tablet  oxyBUTYnin ER (DITROPAN XL) 5 MG 24 hr tablet  riluzole (RILUTEK) 50 MG tablet        Surgical History   Past Surgical History:   Procedure Laterality Date    LUMBAR FUSION  2002,2004         Physical Exam   Patient Vitals for the past 24 hrs:   BP Temp Pulse Resp SpO2   08/18/24 1745 (!) 154/90 98  F (36.7  C) 62 16 94 %       Physical Exam  Vitals: reviewed by me  General: Pt seen on \[facility]\"", pleasant, cooperative, and alert to conversation  Eyes: Tracking well, clear conjunctiva BL  ENT: MMM, midline trachea.  C-collar in place.  No evidence of trauma noted to head or  neck.  Lungs: No tachypnea, no accessory muscle use. No respiratory distress.   CV: Rate as above  Abd: Soft, non tender, no guarding, no rebound. Non distended  MSK: no joint effusion.  No evidence of trauma  Skin: No rash  Neuro: Clear speech and no facial droop.  Psych: Not RIS, no e/o AH/VH      Diagnostics   Lab Results   Labs Ordered and Resulted from Time of ED Arrival to Time of ED Departure   COMPREHENSIVE METABOLIC PANEL - Abnormal       Result Value    Sodium 133 (*)     Potassium 3.8      Carbon Dioxide (CO2) 26      Anion Gap 11      Urea Nitrogen 16.2      Creatinine 0.55      GFR Estimate 89      Calcium 9.0      Chloride 96 (*)     Glucose 100 (*)     Alkaline Phosphatase 67      AST 29      ALT 13      Protein Total 7.1      Albumin 3.7      Bilirubin Total 0.4     INFLUENZA A/B, RSV, & SARS-COV2 PCR - Abnormal    Influenza A PCR Negative      Influenza B PCR Negative      RSV PCR Negative      SARS CoV2 PCR Positive (*)    ROUTINE UA WITH MICROSCOPIC REFLEX TO CULTURE - Abnormal    Color Urine Light Yellow      Appearance Urine Clear      Glucose Urine Negative      Bilirubin Urine Negative      Ketones Urine 10 (*)     Specific Gravity Urine 1.014      Blood Urine Negative      pH Urine 6.5      Protein Albumin Urine Negative      Urobilinogen Urine Normal      Nitrite Urine Negative      Leukocyte Esterase Urine Negative      Bacteria Urine Few (*)     Amorphous Crystals Urine Few (*)     RBC Urine 0      WBC Urine 2     CBC WITH PLATELETS AND DIFFERENTIAL    WBC Count 7.3      RBC Count 3.90      Hemoglobin 12.8      Hematocrit 38.5      MCV 99      MCH 32.8      MCHC 33.2      RDW 12.3      Platelet Count 204      % Neutrophils 59      % Lymphocytes 26      % Monocytes 13      % Eosinophils 1      % Basophils 0      % Immature Granulocytes 1      NRBCs per 100 WBC 0      Absolute Neutrophils 4.3      Absolute Lymphocytes 1.9      Absolute Monocytes 0.9      Absolute Eosinophils 0.1       Absolute Basophils 0.0      Absolute Immature Granulocytes 0.0      Absolute NRBCs 0.0     NT PROBNP INPATIENT       Imaging  XR Chest 2 Views   Final Result   IMPRESSION: Interstitial pulmonary edema, bilateral small pleural effusions and cardiomegaly suggestive of congestive heart failure. Hazy left mid lung opacity could reflect alveolar edema versus superimposed infectious/inflammatory process.       Age-indeterminate upper/mid thoracic vertebral body compression fracture. Lumbar spinal fusion hardware.            CT Head w/o Contrast   Final Result   IMPRESSION:   1.  No CT evidence for acute intracranial process.   2.  Brain atrophy and presumed chronic microvascular ischemic changes similar to prior.      CT Cervical Spine w/o Contrast   Final Result   IMPRESSION:   1.  No fracture or posttraumatic subluxation.          EKG   ECG results from 08/18/24   EKG 12-lead, tracing only     Value    Systolic Blood Pressure     Diastolic Blood Pressure     Ventricular Rate 75    Atrial Rate 75    TN Interval 160    QRS Duration 78        QTc 480    P Axis 57    R AXIS -22    T Axis 31    Interpretation ECG      Sinus rhythm  Possible Left atrial enlargement  Minimal voltage criteria for LVH, may be normal variant ( R in aVL )  Cannot rule out Anteroseptal infarct (cited on or before 01-Apr-2024)  Reviewed by Jorge BEAVERS             Independent Interpretation  Yes-I cannot review the patient's CT scan of the head, no obvious hemorrhage noted.      ED Course      Medications Administered   Medications   dexAMETHasone PF (DECADRON) injection 6 mg (6 mg Intravenous $Given 8/18/24 1953)            Optional/Additional Documentation  None      Medical Decision Making / Diagnosis       MDM  This is a very pleasant 85-year-old female who presents to the emergency room with multiple falls in the last several days as well as a positive COVID test and hypoxia to the mid 80s requiring supplemental oxygenation.  The patient  is quite weak appearing overall but no focal deficits noted.  No concurrent bacterial infection noted and I do think that the COVID and x-ray are enough to explain her hypoxia at this time.  I have given her Decadron since she is hypoxic, and she will be admitted to the hospital for her frequent falls and possibly to get remdesivir versus Paxlovid.  Patient feels comfortable with this plan, will be admitted under isolation.  Will monitor very carefully until next inpatient bed is available      Critical care time was 30 minutes excluding procedures.      ICD-10 Codes:    ICD-10-CM    1. COVID-19  U07.1       2. Hypoxia  R09.02       3. Falls frequently  R29.6       4. Compression fracture of thoracic vertebra, unspecified thoracic vertebral level, initial encounter (H)  S22.000A                             Christiano Patel MD  08/18/24 2041     Yes

## 2024-08-27 ENCOUNTER — NON-APPOINTMENT (OUTPATIENT)
Age: 44
End: 2024-08-27

## 2024-08-27 PROBLEM — N39.3 STRESS INCONTINENCE (FEMALE) (MALE): Chronic | Status: ACTIVE | Noted: 2024-06-18

## 2024-08-28 ENCOUNTER — APPOINTMENT (OUTPATIENT)
Dept: ORTHOPEDIC SURGERY | Facility: CLINIC | Age: 44
End: 2024-08-28

## 2024-09-03 ENCOUNTER — EMERGENCY (EMERGENCY)
Facility: HOSPITAL | Age: 44
LOS: 1 days | Discharge: HOME | End: 2024-09-03
Attending: EMERGENCY MEDICINE | Admitting: EMERGENCY MEDICINE
Payer: COMMERCIAL

## 2024-09-03 VITALS
HEART RATE: 83 BPM | WEIGHT: 164.02 LBS | HEIGHT: 64 IN | OXYGEN SATURATION: 97 % | RESPIRATION RATE: 18 BRPM | DIASTOLIC BLOOD PRESSURE: 86 MMHG | SYSTOLIC BLOOD PRESSURE: 130 MMHG | TEMPERATURE: 99 F

## 2024-09-03 LAB
ALBUMIN SERPL ELPH-MCNC: 3.8 G/DL — SIGNIFICANT CHANGE UP (ref 3.3–5)
ALP SERPL-CCNC: 59 U/L — SIGNIFICANT CHANGE UP (ref 40–120)
ALT FLD-CCNC: 14 U/L — SIGNIFICANT CHANGE UP (ref 10–45)
ANION GAP SERPL CALC-SCNC: 10 MMOL/L — SIGNIFICANT CHANGE UP (ref 5–17)
AST SERPL-CCNC: 12 U/L — SIGNIFICANT CHANGE UP (ref 10–40)
BASOPHILS # BLD AUTO: 0.03 K/UL — SIGNIFICANT CHANGE UP (ref 0–0.2)
BASOPHILS NFR BLD AUTO: 0.4 % — SIGNIFICANT CHANGE UP (ref 0–2)
BILIRUB SERPL-MCNC: 0.2 MG/DL — SIGNIFICANT CHANGE UP (ref 0.2–1.2)
BUN SERPL-MCNC: 10 MG/DL — SIGNIFICANT CHANGE UP (ref 7–23)
CALCIUM SERPL-MCNC: 9 MG/DL — SIGNIFICANT CHANGE UP (ref 8.4–10.5)
CHLORIDE SERPL-SCNC: 103 MMOL/L — SIGNIFICANT CHANGE UP (ref 96–108)
CO2 SERPL-SCNC: 26 MMOL/L — SIGNIFICANT CHANGE UP (ref 22–31)
CREAT SERPL-MCNC: 0.67 MG/DL — SIGNIFICANT CHANGE UP (ref 0.5–1.3)
EGFR: 110 ML/MIN/1.73M2 — SIGNIFICANT CHANGE UP
EOSINOPHIL # BLD AUTO: 0.06 K/UL — SIGNIFICANT CHANGE UP (ref 0–0.5)
EOSINOPHIL NFR BLD AUTO: 0.8 % — SIGNIFICANT CHANGE UP (ref 0–6)
GLUCOSE SERPL-MCNC: 92 MG/DL — SIGNIFICANT CHANGE UP (ref 70–99)
HCT VFR BLD CALC: 35.6 % — SIGNIFICANT CHANGE UP (ref 34.5–45)
HGB BLD-MCNC: 12.3 G/DL — SIGNIFICANT CHANGE UP (ref 11.5–15.5)
IMM GRANULOCYTES NFR BLD AUTO: 0.4 % — SIGNIFICANT CHANGE UP (ref 0–0.9)
LYMPHOCYTES # BLD AUTO: 2.49 K/UL — SIGNIFICANT CHANGE UP (ref 1–3.3)
LYMPHOCYTES # BLD AUTO: 31.8 % — SIGNIFICANT CHANGE UP (ref 13–44)
MCHC RBC-ENTMCNC: 29.1 PG — SIGNIFICANT CHANGE UP (ref 27–34)
MCHC RBC-ENTMCNC: 34.6 GM/DL — SIGNIFICANT CHANGE UP (ref 32–36)
MCV RBC AUTO: 84.2 FL — SIGNIFICANT CHANGE UP (ref 80–100)
MONOCYTES # BLD AUTO: 0.65 K/UL — SIGNIFICANT CHANGE UP (ref 0–0.9)
MONOCYTES NFR BLD AUTO: 8.3 % — SIGNIFICANT CHANGE UP (ref 2–14)
NEUTROPHILS # BLD AUTO: 4.57 K/UL — SIGNIFICANT CHANGE UP (ref 1.8–7.4)
NEUTROPHILS NFR BLD AUTO: 58.3 % — SIGNIFICANT CHANGE UP (ref 43–77)
NRBC # BLD: 0 /100 WBCS — SIGNIFICANT CHANGE UP (ref 0–0)
PLATELET # BLD AUTO: 263 K/UL — SIGNIFICANT CHANGE UP (ref 150–400)
POTASSIUM SERPL-MCNC: 4 MMOL/L — SIGNIFICANT CHANGE UP (ref 3.5–5.3)
POTASSIUM SERPL-SCNC: 4 MMOL/L — SIGNIFICANT CHANGE UP (ref 3.5–5.3)
PROT SERPL-MCNC: 8.2 G/DL — SIGNIFICANT CHANGE UP (ref 6–8.3)
RBC # BLD: 4.23 M/UL — SIGNIFICANT CHANGE UP (ref 3.8–5.2)
RBC # FLD: 12.7 % — SIGNIFICANT CHANGE UP (ref 10.3–14.5)
SODIUM SERPL-SCNC: 139 MMOL/L — SIGNIFICANT CHANGE UP (ref 135–145)
TSH SERPL-MCNC: 1.09 UIU/ML — SIGNIFICANT CHANGE UP (ref 0.36–3.74)
WBC # BLD: 7.83 K/UL — SIGNIFICANT CHANGE UP (ref 3.8–10.5)
WBC # FLD AUTO: 7.83 K/UL — SIGNIFICANT CHANGE UP (ref 3.8–10.5)

## 2024-09-03 PROCEDURE — 36415 COLL VENOUS BLD VENIPUNCTURE: CPT

## 2024-09-03 PROCEDURE — 99284 EMERGENCY DEPT VISIT MOD MDM: CPT | Mod: 25

## 2024-09-03 PROCEDURE — 82607 VITAMIN B-12: CPT

## 2024-09-03 PROCEDURE — 96374 THER/PROPH/DIAG INJ IV PUSH: CPT

## 2024-09-03 PROCEDURE — 84443 ASSAY THYROID STIM HORMONE: CPT

## 2024-09-03 PROCEDURE — 80053 COMPREHEN METABOLIC PANEL: CPT

## 2024-09-03 PROCEDURE — 93971 EXTREMITY STUDY: CPT | Mod: 26,RT

## 2024-09-03 PROCEDURE — 85025 COMPLETE CBC W/AUTO DIFF WBC: CPT

## 2024-09-03 PROCEDURE — 99284 EMERGENCY DEPT VISIT MOD MDM: CPT

## 2024-09-03 PROCEDURE — 82746 ASSAY OF FOLIC ACID SERUM: CPT

## 2024-09-03 PROCEDURE — 93971 EXTREMITY STUDY: CPT

## 2024-09-03 RX ORDER — DIPHENHYDRAMINE HCL 50 MG
25 CAPSULE ORAL ONCE
Refills: 0 | Status: COMPLETED | OUTPATIENT
Start: 2024-09-03 | End: 2024-09-03

## 2024-09-03 RX ORDER — SODIUM CHLORIDE 9 MG/ML
1000 INJECTION INTRAMUSCULAR; INTRAVENOUS; SUBCUTANEOUS ONCE
Refills: 0 | Status: COMPLETED | OUTPATIENT
Start: 2024-09-03 | End: 2024-09-03

## 2024-09-03 RX ADMIN — SODIUM CHLORIDE 1000 MILLILITER(S): 9 INJECTION INTRAMUSCULAR; INTRAVENOUS; SUBCUTANEOUS at 16:42

## 2024-09-03 RX ADMIN — Medication 25 MILLIGRAM(S): at 16:41

## 2024-09-03 NOTE — ED ADULT NURSE NOTE - NSFALLUNIVINTERV_ED_ALL_ED
Bed/Stretcher in lowest position, wheels locked, appropriate side rails in place/Call bell, personal items and telephone in reach/Instruct patient to call for assistance before getting out of bed/chair/stretcher/Non-slip footwear applied when patient is off stretcher/[place] to call system/Physically safe environment - no spills, clutter or unnecessary equipment/Purposeful proactive rounding/Room/bathroom lighting operational, light cord in reach

## 2024-09-03 NOTE — ED ADULT NURSE NOTE - OBJECTIVE STATEMENT
Patient presents to ED complaining of generalized pain on skin. Patient states she feels like she has a sunburn and that her skin is sensitive to touch for 1 week. Patient denies chest pain, denies difficulty breathing, denies intense pain.

## 2024-09-03 NOTE — ED ADULT NURSE NOTE - PATIENT IS UNABLE TO BE SCREENED DUE TO:
Pt calling for same day appt. Pt states that one her cats either bit her or scratched her on her R hand last night, and now she feels it is very hard for her to use her fingers or lift anything with her hand, painful to move her thumb and grasp anything with thumb and first finger, is concerned they may have \"injured my nerve\".   Hand is only swollen slightly, no redness or red streaking. Has 2 scratches on the top of the hand, one on the bottom. Last night she put neosporin on the scratch and covered with bandaid. Iced hand also.   No fever. Feels fine otherwise.   She states cats are up to date on all their vaccines.      Would like to be seen today, called into work last night so needs a work note also. Scheduled for eval by Dr Goins at 2:45 today.   Patient refused

## 2024-09-03 NOTE — ED PROVIDER NOTE - OBJECTIVE STATEMENT
44-year-old female presents to the emergency department reporting hyperesthesia of the skin diffusely.  Patient states that she feels as if she has sunburn has been occurring for over a week.  She is unsure if it is related however it is associated with the pain behind her right knee in the popliteal fossa.  Patient does state that she had recent travel to and from [place] at the beginning of August.  There is no chest pain or shortness of breath.  There is no rash or itching.  Patient states her skin feels very sensitive to the touch such that it is difficult to wear clothing or touch her steering wheel.  No history of similar.  No new medications.  No other medical history.

## 2024-09-03 NOTE — ED ADULT TRIAGE NOTE - CHIEF COMPLAINT QUOTE
Pt c/o generalized pain on her skin, stated she feels like she has sunburn and skin is sensitive to touch for over 1 week

## 2024-09-03 NOTE — ED PROVIDER NOTE - BIRTH SEX
DR MAURER, PODIATRIST IN THE UNIT.UPDATED ON PTS WOUND CONDITION.PHYSICIAN WAS ASKING RE; 
SANTYL THAT HE ORDERED WHEN PT WAS IN TELE, INFORMED MD THAT WE WILL F/U IN AM, DR MAURER SAID 
TO CALL HIM ONCE MEDICATION IS AVAILABLE. Female

## 2024-09-03 NOTE — ED PROVIDER NOTE - NSFOLLOWUPINSTRUCTIONS_ED_ALL_ED_FT
The ultrasound is negative.  There is no clot in the leg.  We recommend that if the pain continues behind the leg to repeat your ultrasound in 7 to 10 days.  Your lab results are normal as well.  We are still waiting on the B12 and folate levels to return however if any acute concerns, we will notify you by phone.  Nonetheless follow-up with your primary care provider.

## 2024-09-03 NOTE — ED PROVIDER NOTE - PATIENT PORTAL LINK FT
Discontinue Regimen: Efudex Render In Strict Bullet Format?: Yes Initiate Treatment: Hydrocortisone 2.5% 2-4times daily until smooth for about 1 wk. Detail Level: Simple You can access the FollowMyHealth Patient Portal offered by [facility] by registering at the following website: [email or web address]. By joining [facility]’s FollowMyHealth portal, you will also be able to view your health information using other applications (apps) compatible with our system.

## 2024-09-03 NOTE — ED PROVIDER NOTE - CLINICAL SUMMARY MEDICAL DECISION MAKING FREE TEXT BOX
44-year-old female presents to the emergency department reporting hyperesthesia of the skin diffusely.  Patient states that she feels as if she has sunburn has been occurring for over a week.  She is unsure if it is related however it is associated with the pain behind her right knee in the popliteal fossa.  Patient does state that she had recent travel to and from [place] at the beginning of August.  There is no chest pain or shortness of breath.  There is no rash or itching.  Patient states her skin feels very sensitive to the touch such that it is difficult to wear clothing or touch her steering wheel.  No history of similar.  No new medications.  No other medical history. Informed patient of no emergent concern for the hyperesthesia however will assess labs since plan for ultrasound of right lower extremity.  Will attempt to give low-dose Benadryl IV and reassess. 44-year-old female presents to the emergency department reporting hyperesthesia of the skin diffusely.  Patient states that she feels as if she has sunburn has been occurring for over a week.  She is unsure if it is related however it is associated with the pain behind her right knee in the popliteal fossa.  Patient does state that she had recent travel to and from [place] at the beginning of August.  There is no chest pain or shortness of breath.  There is no rash or itching.  Patient states her skin feels very sensitive to the touch such that it is difficult to wear clothing or touch her steering wheel.  No history of similar.  No new medications.  No other medical history. Informed patient of no emergent concern for the hyperesthesia however will assess labs since plan for ultrasound of right lower extremity.  Will attempt to give low-dose Benadryl IV and reassess.  Results reviewed.  No acute findings.  B12 and folate still pending however no other findings.  Ultrasound negative.  Advised patient to follow-up with PCP and repeat ultrasound in 7 to 10 days if continued lower extremity discomfort and pain.  As far as hyperesthesia, no acute considerations at this time however also advised to follow-up with PCP.

## 2024-09-03 NOTE — ED ADULT NURSE NOTE - IN ACCORDANCE WITH [place] STATE LAW, WE OFFER EVERY PATIENT A HEPATITIS C TEST. WOULD YOU LIKE TO BE TESTED TODAY?
Height (cm): 165.1 (10-02)  Weight (kg): 69 (10-02)  BMI (kg/m2): 25.3 (10-02)  IBW:     56.6+/- 10%     % IBW:     122%    UBW:     68kg   %UBW:    101%
Opt out

## 2024-09-03 NOTE — ED PROVIDER NOTE - PHYSICAL EXAMINATION
Vitals: I have reviewed the patients vital signs  General: nontoxic appearing  HEENT: Atraumatic, normocephalic, airway patent  Eyes: EOMI, tracking appropriately  Neck: no tracheal deviation  Chest/Lungs: no trauma, symmetric chest rise, speaking in complete sentences,  no resp distress  Heart: skin and extremities well perfused, regular rate and rhythm  Neuro: A+Ox3, ambulating without difficulty, appears non focal  MSK: strength at baseline in all extremities, no muscle wasting or atrophy, no calf tenderness. tender right posterior popliteal fossa.   Skin: no cyanosis, no jaundice or rash. normal color. no rash. pt visibly uncomfortable with touch diffusely.

## 2024-09-04 LAB
FOLATE SERPL-MCNC: 8 NG/ML — SIGNIFICANT CHANGE UP
VIT B12 SERPL-MCNC: >2000 PG/ML — HIGH (ref 232–1245)

## 2024-09-07 ENCOUNTER — APPOINTMENT (OUTPATIENT)
Dept: DERMATOLOGY | Facility: CLINIC | Age: 44
End: 2024-09-07
Payer: COMMERCIAL

## 2024-09-07 DIAGNOSIS — D22.5 MELANOCYTIC NEVI OF TRUNK: ICD-10-CM

## 2024-09-07 DIAGNOSIS — R20.2 PARESTHESIA OF SKIN: ICD-10-CM

## 2024-09-07 PROCEDURE — 99203 OFFICE O/P NEW LOW 30 MIN: CPT

## 2024-09-07 NOTE — HISTORY OF PRESENT ILLNESS
[FreeTextEntry1] : Tender skin generalized for 2 weeks. No previous history. No treatment. [de-identified] : Denies change in meds or products. No history of spine issues.

## 2024-09-07 NOTE — CONSULT LETTER
[Dear  ___] : Dear  [unfilled], [Consult Letter:] : I had the pleasure of evaluating your patient, [unfilled]. [Please see my note below.] : Please see my note below. [Consult Closing:] : Thank you very much for allowing me to participate in the care of this patient.  If you have any questions, please do not hesitate to contact me. [Sincerely,] : Sincerely, [FreeTextEntry3] : Aguila Lora MD, FAAD

## 2024-09-07 NOTE — ASSESSMENT
[FreeTextEntry1] :   Alert, oriented, well pleasant. No personal history of cutaneous malignancy. No family history of cutaneous malignancy.   Sun-exposed cutaneous exam. No evidence of cutaneous malignancy. Actinic damage. Brown macules <0.6cm generalized. Nevi. No treatment. No evidence of exanthem. Skin not tender but sensitive to light touch. "Feels like I have a sunburn" but has not been in the sun. Eyes nose lips intraoral clear. Dermatographism is absent.   Urgicenter visit w/u for dvt negative. Soft tissue injury of right knee. Paresthesia. Unknown etiology.    try loratadine qam for 2 months.   consider neurologic evaluation for possible trial of gabapentin or similar medicine.

## 2024-09-10 ENCOUNTER — NON-APPOINTMENT (OUTPATIENT)
Age: 44
End: 2024-09-10

## 2024-09-10 ENCOUNTER — APPOINTMENT (OUTPATIENT)
Dept: ORTHOPEDIC SURGERY | Facility: CLINIC | Age: 44
End: 2024-09-10
Payer: COMMERCIAL

## 2024-09-10 VITALS — HEIGHT: 64 IN | BODY MASS INDEX: 27.31 KG/M2 | WEIGHT: 160 LBS

## 2024-09-10 DIAGNOSIS — M22.2X1 PATELLOFEMORAL DISORDERS, RIGHT KNEE: ICD-10-CM

## 2024-09-10 PROCEDURE — 73562 X-RAY EXAM OF KNEE 3: CPT | Mod: RT

## 2024-09-10 PROCEDURE — 99203 OFFICE O/P NEW LOW 30 MIN: CPT

## 2024-09-10 NOTE — PHYSICAL EXAM
[de-identified] : Constitutional: Well-nourished, well-developed, No acute distress Respiratory:  Good respiratory effort, no SOB Lymphatic: No regional lymphadenopathy, no lymphedema Psychiatric: Pleasant and normal affect, alert and oriented x3 Musculoskeletal: normal except where as noted in regional exam  Right knee: APPEARANCE: no marked deformities, no swelling or malalignment POSITIVE TENDERNESS:  + crepitus of the anterior knee, and tenderness of patellar retinaculum NONTENDER: jt lines b/l, patellar & quadriceps tendons, MCL/LCL, ITB at the lateral femoral condyle & Gerdy's tubercle, pes bursa.  ROM: full with some discomfort in deep knee flexion RESISTIVE TESTING: + discomfort with knee ext from deep knee flexion (stretched position), painless knee flexion.  SPECIAL TESTS: stable v/v stress. painless grind. neg Lachman's. neg ant/post drawer. neg Satish's.   [de-identified] :  The following radiographs were ordered and read by me during this patient's visit. I reviewed each radiograph in detail with the patient and discussed the findings as highlighted below.   3 views of the right knee were obtained today that show no fracture, or dislocation. There are no degenerative changes seen. There is no malalignment. No obvious osseous abnormality. Otherwise unremarkable.

## 2024-09-10 NOTE — DISCUSSION/SUMMARY
[de-identified] : Discussed findings of today's exam and possible causes of patient's pain.  Educated patient on their most probable diagnosis of atraumatic onset of right knee pain due to patellofemoral pain syndrome.  Reviewed possible courses of treatment, and we collaboratively decided best course of treatment at this time will include conservative management.  Patient has no evidence of internal derangement based on history and clinical exam, she has normal findings on x-ray, no need for MRI at this time.  Patient may continue taking Aleve as needed, advised to take 2 tablets with food, 2 times a day for the next 1 to 2 weeks (We discussed all possible side effects of this medication).  Patient advised to  over-the-counter knee compression sleeve to be worn during the day for support.  If she has persisting pain would recommend a course of physical therapy at that time.  Follow up as needed.  Patient appreciates and agrees with current plan.  This note was generated using dragon medical dictation software.  A reasonable effort has been made for proofreading its contents, but typos may still remain.  If there are any questions or points of clarification needed please notify my office.

## 2024-09-10 NOTE — HISTORY OF PRESENT ILLNESS
[de-identified] : 44 F, anxiety, presents with three weeks of right posterior knee pain Does not recall any NIKKI or inciting event Pain is in back of knee, posterior and lateral States that pain is minimal in the morning but progresses as the day goes on Has pain with maximal flexion of knee, also pain going down stairs and walking/standing for prolonged periods She takes Motrin as needed for pain

## 2024-10-09 ENCOUNTER — NON-APPOINTMENT (OUTPATIENT)
Age: 44
End: 2024-10-09

## 2024-10-30 ENCOUNTER — APPOINTMENT (OUTPATIENT)
Dept: OBGYN | Facility: CLINIC | Age: 44
End: 2024-10-30
Payer: COMMERCIAL

## 2024-10-30 VITALS
TEMPERATURE: 97.5 F | WEIGHT: 160 LBS | OXYGEN SATURATION: 99 % | BODY MASS INDEX: 27.31 KG/M2 | HEART RATE: 91 BPM | SYSTOLIC BLOOD PRESSURE: 118 MMHG | HEIGHT: 64 IN | DIASTOLIC BLOOD PRESSURE: 70 MMHG

## 2024-10-30 DIAGNOSIS — N93.9 ABNORMAL UTERINE AND VAGINAL BLEEDING, UNSPECIFIED: ICD-10-CM

## 2024-10-30 DIAGNOSIS — N92.6 IRREGULAR MENSTRUATION, UNSPECIFIED: ICD-10-CM

## 2024-10-30 DIAGNOSIS — N76.0 ACUTE VAGINITIS: ICD-10-CM

## 2024-10-30 PROCEDURE — 99213 OFFICE O/P EST LOW 20 MIN: CPT

## 2024-10-30 PROCEDURE — 81025 URINE PREGNANCY TEST: CPT

## 2024-10-30 RX ORDER — FLUCONAZOLE 150 MG/1
150 TABLET ORAL
Qty: 3 | Refills: 1 | Status: ACTIVE | COMMUNITY
Start: 2024-10-30 | End: 1900-01-01

## 2024-10-30 RX ORDER — METRONIDAZOLE 7.5 MG/G
0.75 GEL VAGINAL
Qty: 1 | Refills: 1 | Status: ACTIVE | COMMUNITY
Start: 2024-10-30 | End: 1900-01-01

## 2024-10-31 ENCOUNTER — APPOINTMENT (OUTPATIENT)
Dept: ULTRASOUND IMAGING | Facility: CLINIC | Age: 44
End: 2024-10-31
Payer: COMMERCIAL

## 2024-10-31 LAB
APPEARANCE: ABNORMAL
BACTERIA: ABNORMAL /HPF
BILIRUBIN URINE: NEGATIVE
BLOOD URINE: NEGATIVE
CAST: 0 /LPF
COLOR: YELLOW
EPITHELIAL CELLS: >36 /HPF
GLUCOSE QUALITATIVE U: NEGATIVE MG/DL
KETONES URINE: NEGATIVE MG/DL
LEUKOCYTE ESTERASE URINE: ABNORMAL
MICROSCOPIC-UA: NORMAL
NITRITE URINE: NEGATIVE
PH URINE: 6
PROTEIN URINE: NORMAL MG/DL
RED BLOOD CELLS URINE: 0 /HPF
REVIEW: NORMAL
SPECIFIC GRAVITY URINE: 1.03
UROBILINOGEN URINE: 0.2 MG/DL
WHITE BLOOD CELLS URINE: 6 /HPF

## 2024-10-31 PROCEDURE — 76830 TRANSVAGINAL US NON-OB: CPT

## 2024-10-31 PROCEDURE — 76856 US EXAM PELVIC COMPLETE: CPT

## 2024-11-02 LAB
BACTERIA UR CULT: NORMAL
C TRACH RRNA SPEC QL NAA+PROBE: NOT DETECTED
CANDIDA VAG CYTO: NOT DETECTED
G VAGINALIS+PREV SP MTYP VAG QL MICRO: DETECTED
N GONORRHOEA RRNA SPEC QL NAA+PROBE: NOT DETECTED
SOURCE AMPLIFICATION: NORMAL
T VAGINALIS VAG QL WET PREP: NOT DETECTED

## 2024-11-11 ENCOUNTER — RESULT REVIEW (OUTPATIENT)
Age: 44
End: 2024-11-11

## 2024-11-11 ENCOUNTER — APPOINTMENT (OUTPATIENT)
Dept: ULTRASOUND IMAGING | Facility: HOSPITAL | Age: 44
End: 2024-11-11
Payer: COMMERCIAL

## 2024-11-11 ENCOUNTER — OUTPATIENT (OUTPATIENT)
Dept: OUTPATIENT SERVICES | Facility: HOSPITAL | Age: 44
LOS: 1 days | End: 2024-11-11
Payer: COMMERCIAL

## 2024-11-11 ENCOUNTER — APPOINTMENT (OUTPATIENT)
Dept: MAMMOGRAPHY | Facility: HOSPITAL | Age: 44
End: 2024-11-11
Payer: COMMERCIAL

## 2024-11-11 DIAGNOSIS — R92.2 INCONCLUSIVE MAMMOGRAM: ICD-10-CM

## 2024-11-11 DIAGNOSIS — Z12.39 ENCOUNTER FOR OTHER SCREENING FOR MALIGNANT NEOPLASM OF BREAST: ICD-10-CM

## 2024-11-11 PROCEDURE — 77067 SCR MAMMO BI INCL CAD: CPT | Mod: 26

## 2024-11-11 PROCEDURE — 76641 ULTRASOUND BREAST COMPLETE: CPT | Mod: 26,50

## 2024-11-11 PROCEDURE — 76641 ULTRASOUND BREAST COMPLETE: CPT

## 2024-11-11 PROCEDURE — 77063 BREAST TOMOSYNTHESIS BI: CPT

## 2024-11-11 PROCEDURE — 77067 SCR MAMMO BI INCL CAD: CPT

## 2024-11-11 PROCEDURE — 77063 BREAST TOMOSYNTHESIS BI: CPT | Mod: 26

## 2024-11-18 ENCOUNTER — APPOINTMENT (OUTPATIENT)
Dept: OBGYN | Facility: CLINIC | Age: 44
End: 2024-11-18
Payer: COMMERCIAL

## 2024-11-18 VITALS
HEIGHT: 64 IN | DIASTOLIC BLOOD PRESSURE: 78 MMHG | OXYGEN SATURATION: 98 % | RESPIRATION RATE: 16 BRPM | TEMPERATURE: 97.5 F | BODY MASS INDEX: 27.66 KG/M2 | HEART RATE: 78 BPM | WEIGHT: 162 LBS | SYSTOLIC BLOOD PRESSURE: 118 MMHG

## 2024-11-18 DIAGNOSIS — N76.0 ACUTE VAGINITIS: ICD-10-CM

## 2024-11-18 DIAGNOSIS — Z12.4 ENCOUNTER FOR SCREENING FOR MALIGNANT NEOPLASM OF CERVIX: ICD-10-CM

## 2024-11-18 DIAGNOSIS — Z78.9 OTHER SPECIFIED HEALTH STATUS: ICD-10-CM

## 2024-11-18 DIAGNOSIS — B96.89 ACUTE VAGINITIS: ICD-10-CM

## 2024-11-18 DIAGNOSIS — N89.8 OTHER SPECIFIED NONINFLAMMATORY DISORDERS OF VAGINA: ICD-10-CM

## 2024-11-18 DIAGNOSIS — Z01.419 ENCOUNTER FOR GYNECOLOGICAL EXAMINATION (GENERAL) (ROUTINE) W/OUT ABNORMAL FINDINGS: ICD-10-CM

## 2024-11-18 PROCEDURE — 99396 PREV VISIT EST AGE 40-64: CPT

## 2024-11-18 PROCEDURE — 99213 OFFICE O/P EST LOW 20 MIN: CPT | Mod: 25

## 2024-11-18 RX ORDER — FLUCONAZOLE 150 MG/1
150 TABLET ORAL
Qty: 3 | Refills: 1 | Status: ACTIVE | COMMUNITY
Start: 2024-11-18 | End: 1900-01-01

## 2024-11-18 RX ORDER — METRONIDAZOLE 500 MG/1
500 TABLET ORAL TWICE DAILY
Qty: 10 | Refills: 1 | Status: ACTIVE | COMMUNITY
Start: 2024-11-18 | End: 1900-01-01

## 2024-11-18 RX ORDER — SEMAGLUTIDE 1.34 MG/ML
2 INJECTION, SOLUTION SUBCUTANEOUS
Refills: 0 | Status: ACTIVE | COMMUNITY

## 2024-11-20 LAB
CANDIDA VAG CYTO: DETECTED
G VAGINALIS+PREV SP MTYP VAG QL MICRO: DETECTED
HPV 16 E6+E7 MRNA CVX QL NAA+PROBE: NOT DETECTED
HPV HIGH+LOW RISK DNA PNL CVX: NOT DETECTED
HPV18+45 E6+E7 MRNA CVX QL NAA+PROBE: NOT DETECTED
T VAGINALIS VAG QL WET PREP: NOT DETECTED

## 2024-11-23 LAB — CYTOLOGY CVX/VAG DOC THIN PREP: ABNORMAL

## 2024-11-23 RX ORDER — PUMPKIN SEED EXTRACT/SOY GERM 300 MG
600 CAPSULE ORAL DAILY
Qty: 30 | Refills: 1 | Status: ACTIVE | COMMUNITY
Start: 2024-11-23 | End: 1900-01-01

## 2025-02-12 ENCOUNTER — APPOINTMENT (OUTPATIENT)
Dept: ORTHOPEDIC SURGERY | Facility: CLINIC | Age: 45
End: 2025-02-12

## 2025-03-24 ENCOUNTER — APPOINTMENT (OUTPATIENT)
Dept: ORTHOPEDIC SURGERY | Facility: CLINIC | Age: 45
End: 2025-03-24
Payer: COMMERCIAL

## 2025-03-24 ENCOUNTER — NON-APPOINTMENT (OUTPATIENT)
Age: 45
End: 2025-03-24

## 2025-03-24 VITALS — WEIGHT: 160 LBS | BODY MASS INDEX: 27.31 KG/M2 | HEIGHT: 64 IN

## 2025-03-24 DIAGNOSIS — M75.41 IMPINGEMENT SYNDROME OF RIGHT SHOULDER: ICD-10-CM

## 2025-03-24 DIAGNOSIS — M25.511 PAIN IN RIGHT SHOULDER: ICD-10-CM

## 2025-03-24 PROCEDURE — 73030 X-RAY EXAM OF SHOULDER: CPT | Mod: RT

## 2025-03-24 PROCEDURE — 99214 OFFICE O/P EST MOD 30 MIN: CPT

## 2025-04-23 ENCOUNTER — NON-APPOINTMENT (OUTPATIENT)
Age: 45
End: 2025-04-23

## 2025-05-02 ENCOUNTER — EMERGENCY (EMERGENCY)
Facility: HOSPITAL | Age: 45
LOS: 1 days | End: 2025-05-02
Attending: EMERGENCY MEDICINE | Admitting: EMERGENCY MEDICINE
Payer: COMMERCIAL

## 2025-05-02 VITALS
HEART RATE: 102 BPM | TEMPERATURE: 102 F | HEIGHT: 64 IN | SYSTOLIC BLOOD PRESSURE: 122 MMHG | RESPIRATION RATE: 18 BRPM | WEIGHT: 156.97 LBS | DIASTOLIC BLOOD PRESSURE: 73 MMHG | OXYGEN SATURATION: 98 %

## 2025-05-02 VITALS — HEART RATE: 95 BPM | SYSTOLIC BLOOD PRESSURE: 117 MMHG | DIASTOLIC BLOOD PRESSURE: 63 MMHG | TEMPERATURE: 99 F

## 2025-05-02 LAB
ALBUMIN SERPL ELPH-MCNC: 3.5 G/DL — SIGNIFICANT CHANGE UP (ref 3.3–5)
ALP SERPL-CCNC: 53 U/L — SIGNIFICANT CHANGE UP (ref 40–120)
ALT FLD-CCNC: 14 U/L — SIGNIFICANT CHANGE UP (ref 10–45)
ANION GAP SERPL CALC-SCNC: 6 MMOL/L — SIGNIFICANT CHANGE UP (ref 5–17)
APPEARANCE UR: ABNORMAL
APTT BLD: 31.9 SEC — SIGNIFICANT CHANGE UP (ref 26.1–36.8)
AST SERPL-CCNC: 15 U/L — SIGNIFICANT CHANGE UP (ref 10–40)
BACTERIA # UR AUTO: ABNORMAL /HPF
BASOPHILS # BLD AUTO: 0.01 K/UL — SIGNIFICANT CHANGE UP (ref 0–0.2)
BASOPHILS NFR BLD AUTO: 0.4 % — SIGNIFICANT CHANGE UP (ref 0–2)
BILIRUB SERPL-MCNC: 0.2 MG/DL — SIGNIFICANT CHANGE UP (ref 0.2–1.2)
BILIRUB UR-MCNC: NEGATIVE — SIGNIFICANT CHANGE UP
BUN SERPL-MCNC: 12 MG/DL — SIGNIFICANT CHANGE UP (ref 7–23)
CALCIUM SERPL-MCNC: 8.9 MG/DL — SIGNIFICANT CHANGE UP (ref 8.4–10.5)
CHLORIDE SERPL-SCNC: 101 MMOL/L — SIGNIFICANT CHANGE UP (ref 96–108)
CO2 SERPL-SCNC: 27 MMOL/L — SIGNIFICANT CHANGE UP (ref 22–31)
COLOR SPEC: YELLOW — SIGNIFICANT CHANGE UP
CREAT SERPL-MCNC: 0.71 MG/DL — SIGNIFICANT CHANGE UP (ref 0.5–1.3)
DIFF PNL FLD: ABNORMAL
EGFR: 108 ML/MIN/1.73M2 — SIGNIFICANT CHANGE UP
EGFR: 108 ML/MIN/1.73M2 — SIGNIFICANT CHANGE UP
EOSINOPHIL # BLD AUTO: 0.14 K/UL — SIGNIFICANT CHANGE UP (ref 0–0.5)
EOSINOPHIL NFR BLD AUTO: 5 % — SIGNIFICANT CHANGE UP (ref 0–6)
EPI CELLS # UR: PRESENT
FLUAV AG NPH QL: SIGNIFICANT CHANGE UP
FLUBV AG NPH QL: SIGNIFICANT CHANGE UP
GLUCOSE SERPL-MCNC: 93 MG/DL — SIGNIFICANT CHANGE UP (ref 70–99)
GLUCOSE UR QL: NEGATIVE MG/DL — SIGNIFICANT CHANGE UP
HCG SERPL-ACNC: <1 MIU/ML — SIGNIFICANT CHANGE UP
HCT VFR BLD CALC: 35.5 % — SIGNIFICANT CHANGE UP (ref 34.5–45)
HGB BLD-MCNC: 12 G/DL — SIGNIFICANT CHANGE UP (ref 11.5–15.5)
IMM GRANULOCYTES NFR BLD AUTO: 0.4 % — SIGNIFICANT CHANGE UP (ref 0–0.9)
INR BLD: 1 RATIO — SIGNIFICANT CHANGE UP (ref 0.85–1.16)
KETONES UR-MCNC: NEGATIVE MG/DL — SIGNIFICANT CHANGE UP
LACTATE SERPL-SCNC: 0.8 MMOL/L — SIGNIFICANT CHANGE UP (ref 0.7–2)
LEUKOCYTE ESTERASE UR-ACNC: ABNORMAL
LYMPHOCYTES # BLD AUTO: 0.54 K/UL — LOW (ref 1–3.3)
LYMPHOCYTES # BLD AUTO: 19.1 % — SIGNIFICANT CHANGE UP (ref 13–44)
MCHC RBC-ENTMCNC: 28.4 PG — SIGNIFICANT CHANGE UP (ref 27–34)
MCHC RBC-ENTMCNC: 33.8 G/DL — SIGNIFICANT CHANGE UP (ref 32–36)
MCV RBC AUTO: 84.1 FL — SIGNIFICANT CHANGE UP (ref 80–100)
MONOCYTES # BLD AUTO: 0.43 K/UL — SIGNIFICANT CHANGE UP (ref 0–0.9)
MONOCYTES NFR BLD AUTO: 15.2 % — HIGH (ref 2–14)
NEUTROPHILS # BLD AUTO: 1.69 K/UL — LOW (ref 1.8–7.4)
NEUTROPHILS NFR BLD AUTO: 59.9 % — SIGNIFICANT CHANGE UP (ref 43–77)
NITRITE UR-MCNC: NEGATIVE — SIGNIFICANT CHANGE UP
NRBC BLD AUTO-RTO: 0 /100 WBCS — SIGNIFICANT CHANGE UP (ref 0–0)
PH UR: 7 — SIGNIFICANT CHANGE UP (ref 5–8)
PLATELET # BLD AUTO: 216 K/UL — SIGNIFICANT CHANGE UP (ref 150–400)
POTASSIUM SERPL-MCNC: 3.9 MMOL/L — SIGNIFICANT CHANGE UP (ref 3.5–5.3)
POTASSIUM SERPL-SCNC: 3.9 MMOL/L — SIGNIFICANT CHANGE UP (ref 3.5–5.3)
PROCALCITONIN SERPL-MCNC: 0.08 NG/ML — SIGNIFICANT CHANGE UP
PROT SERPL-MCNC: 7.9 G/DL — SIGNIFICANT CHANGE UP (ref 6–8.3)
PROT UR-MCNC: SIGNIFICANT CHANGE UP MG/DL
PROTHROM AB SERPL-ACNC: 11.8 SEC — SIGNIFICANT CHANGE UP (ref 9.9–13.4)
RBC # BLD: 4.22 M/UL — SIGNIFICANT CHANGE UP (ref 3.8–5.2)
RBC # FLD: 12.3 % — SIGNIFICANT CHANGE UP (ref 10.3–14.5)
RBC CASTS # UR COMP ASSIST: 0 /HPF — SIGNIFICANT CHANGE UP (ref 0–4)
RSV RNA NPH QL NAA+NON-PROBE: SIGNIFICANT CHANGE UP
SARS-COV-2 RNA SPEC QL NAA+PROBE: SIGNIFICANT CHANGE UP
SODIUM SERPL-SCNC: 134 MMOL/L — LOW (ref 135–145)
SOURCE RESPIRATORY: SIGNIFICANT CHANGE UP
SP GR SPEC: 1.02 — SIGNIFICANT CHANGE UP (ref 1–1.03)
TROPONIN I, HIGH SENSITIVITY RESULT: <4 NG/L — SIGNIFICANT CHANGE UP
UROBILINOGEN FLD QL: 1 MG/DL — SIGNIFICANT CHANGE UP (ref 0.2–1)
WBC # BLD: 2.82 K/UL — LOW (ref 3.8–10.5)
WBC # FLD AUTO: 2.82 K/UL — LOW (ref 3.8–10.5)
WBC UR QL: 12 /HPF — HIGH (ref 0–5)

## 2025-05-02 PROCEDURE — 87040 BLOOD CULTURE FOR BACTERIA: CPT

## 2025-05-02 PROCEDURE — 96374 THER/PROPH/DIAG INJ IV PUSH: CPT

## 2025-05-02 PROCEDURE — 85610 PROTHROMBIN TIME: CPT

## 2025-05-02 PROCEDURE — 93005 ELECTROCARDIOGRAM TRACING: CPT

## 2025-05-02 PROCEDURE — 81001 URINALYSIS AUTO W/SCOPE: CPT

## 2025-05-02 PROCEDURE — 71045 X-RAY EXAM CHEST 1 VIEW: CPT

## 2025-05-02 PROCEDURE — 93010 ELECTROCARDIOGRAM REPORT: CPT

## 2025-05-02 PROCEDURE — 71045 X-RAY EXAM CHEST 1 VIEW: CPT | Mod: 26

## 2025-05-02 PROCEDURE — 80053 COMPREHEN METABOLIC PANEL: CPT

## 2025-05-02 PROCEDURE — 96375 TX/PRO/DX INJ NEW DRUG ADDON: CPT

## 2025-05-02 PROCEDURE — 87637 SARSCOV2&INF A&B&RSV AMP PRB: CPT

## 2025-05-02 PROCEDURE — 84145 PROCALCITONIN (PCT): CPT

## 2025-05-02 PROCEDURE — 36415 COLL VENOUS BLD VENIPUNCTURE: CPT

## 2025-05-02 PROCEDURE — 87086 URINE CULTURE/COLONY COUNT: CPT

## 2025-05-02 PROCEDURE — 85730 THROMBOPLASTIN TIME PARTIAL: CPT

## 2025-05-02 PROCEDURE — 99285 EMERGENCY DEPT VISIT HI MDM: CPT | Mod: 25

## 2025-05-02 PROCEDURE — 99285 EMERGENCY DEPT VISIT HI MDM: CPT

## 2025-05-02 PROCEDURE — 85025 COMPLETE CBC W/AUTO DIFF WBC: CPT

## 2025-05-02 PROCEDURE — 83605 ASSAY OF LACTIC ACID: CPT

## 2025-05-02 PROCEDURE — 84484 ASSAY OF TROPONIN QUANT: CPT

## 2025-05-02 PROCEDURE — 84702 CHORIONIC GONADOTROPIN TEST: CPT

## 2025-05-02 RX ORDER — CEFTRIAXONE 500 MG/1
1000 INJECTION, POWDER, FOR SOLUTION INTRAMUSCULAR; INTRAVENOUS ONCE
Refills: 0 | Status: COMPLETED | OUTPATIENT
Start: 2025-05-02 | End: 2025-05-02

## 2025-05-02 RX ORDER — ACETAMINOPHEN 500 MG/5ML
1000 LIQUID (ML) ORAL ONCE
Refills: 0 | Status: COMPLETED | OUTPATIENT
Start: 2025-05-02 | End: 2025-05-02

## 2025-05-02 RX ORDER — CEPHALEXIN 250 MG/1
1 CAPSULE ORAL
Qty: 14 | Refills: 0
Start: 2025-05-02 | End: 2025-05-08

## 2025-05-02 RX ADMIN — Medication 400 MILLIGRAM(S): at 18:10

## 2025-05-02 RX ADMIN — CEFTRIAXONE 100 MILLIGRAM(S): 500 INJECTION, POWDER, FOR SOLUTION INTRAMUSCULAR; INTRAVENOUS at 18:11

## 2025-05-02 RX ADMIN — Medication 2200 MILLILITER(S): at 18:11

## 2025-05-02 NOTE — ED PROVIDER NOTE - PATIENT PORTAL LINK FT
You can access the FollowMyHealth Patient Portal offered by [facility] by registering at the following website: [email or web address]. By joining [facility]’s FollowMyHealth portal, you will also be able to view your health information using other applications (apps) compatible with our system.

## 2025-05-02 NOTE — ED PROVIDER NOTE - NSFOLLOWUPINSTRUCTIONS_ED_ALL_ED_FT
Take Keflex 500 mg twice a day for the next 7 days, finish entire duration even if feeling better.  Follow-up with your primary care doctor this week.  Return to ED if any new or worsening symptoms.  For any fevers, can take Tylenol and Motrin, follow directions at home.  Fever should resolve after 2 days on antibiotics.    A urinary tract infection (UTI) is an infection caused by bacteria. It can happen anywhere in the urinary tract. A UTI can happen in the:    Kidneys.  Ureters, the tubes that connect the kidneys to the bladder.  Bladder.  Urethra, where the urine comes out.  Most UTIs are bladder infections. They often cause pain or burning when you urinate.    Most UTIs can be cured with antibiotics. If you are prescribed antibiotics, be sure to complete your treatment so that the infection does not get worse.    Follow-up care is a key part of your treatment and safety. Be sure to make and go to all appointments, and call your doctor or nurse advice line (811 in most provinces and [facility]) if you are having problems. It's also a good idea to know your test results and keep a list of the medicines you take.    How can you care for yourself at home?  Take your antibiotics as directed. Do not stop taking them just because you feel better. You need to take the full course of antibiotics.  Drink extra water and other fluids for the next day or two. This will help make the urine less concentrated and help wash out the bacteria that are causing the infection. (If you have kidney, heart, or liver disease and have to limit fluids, talk with your doctor before you increase the amount of fluids you drink.)  Avoid drinks that are carbonated or have caffeine. They can irritate the bladder.  Urinate often. Try to empty your bladder each time.  To relieve pain, take a hot bath or lay a heating pad set on low over your lower belly or genital area. Never go to sleep with a heating pad in place.  To prevent UTIs  Drink plenty of water each day. This helps you urinate often, which clears bacteria from your system. (If you have kidney, heart, or liver disease and have to limit fluids, talk with your doctor before you increase the amount of fluids you drink.)  Urinate when you need to.  If you are sexually active, urinate right after you have sex.  Change sanitary pads often.  Avoid douches, bubble baths, feminine hygiene sprays, and other feminine hygiene products that have deodorants.  After you use the toilet, wipe from front to back.  When should you call for help?  	  Call your doctor or nurse advice line now or seek immediate medical care if:    You have new or worse fever, chills, nausea, or vomiting.  You have new pain in your back just below your rib cage. This is called flank pain.  There is new blood or pus in your urine.  You have any problems with your antibiotic medicine.  Watch closely for changes in your health, and be sure to contact your doctor or nurse advice line if:    You do not feel better after 2 days on an antibiotic.  Your symptoms go away but then come back.

## 2025-05-02 NOTE — ED PROVIDER NOTE - OBJECTIVE STATEMENT
44-year-old female presenting ED for evaluation of chills, myalgia, dry cough x 7 days.  Fever Tmax 101 °F orally starting this morning.  Patient called her PCP who advised to go to ED for evaluation to rule out pneumonia.  No other complaints. denies ab pain, CP, n/v/d.

## 2025-05-02 NOTE — ED PROVIDER NOTE - CLINICAL SUMMARY MEDICAL DECISION MAKING FREE TEXT BOX
febrile and tachycardic on arrival  full septic workup, 30 cc/kg IVFs, ofirmev, CXR, rocephin    (+) for UTI.  given pt is healthy, lactate negative, VSS after meds, shared decision making regardingAdmission for observation versus discharge home, patient feels comfortable going home, will DC with PCP follow-up, strict return precautions. febrile and tachycardic on arrival  full septic workup, 30 cc/kg IVFs, ofirmev, CXR, rocephin    ECG: NSR 93 bpm, normal axis, normal intervals, no stemi    (+) for UTI.  given pt is healthy, lactate negative, VSS after meds, shared decision making regardingAdmission for observation versus discharge home, patient feels comfortable going home, will DC with PCP follow-up, strict return precautions.

## 2025-05-04 LAB
CULTURE RESULTS: SIGNIFICANT CHANGE UP
SPECIMEN SOURCE: SIGNIFICANT CHANGE UP

## 2025-05-06 ENCOUNTER — NON-APPOINTMENT (OUTPATIENT)
Age: 45
End: 2025-05-06

## 2025-05-06 ENCOUNTER — EMERGENCY (EMERGENCY)
Facility: HOSPITAL | Age: 45
LOS: 1 days | End: 2025-05-06
Attending: EMERGENCY MEDICINE | Admitting: EMERGENCY MEDICINE
Payer: COMMERCIAL

## 2025-05-06 VITALS
SYSTOLIC BLOOD PRESSURE: 119 MMHG | HEART RATE: 88 BPM | RESPIRATION RATE: 14 BRPM | DIASTOLIC BLOOD PRESSURE: 81 MMHG | OXYGEN SATURATION: 99 % | TEMPERATURE: 97 F | HEIGHT: 64 IN | WEIGHT: 156.97 LBS

## 2025-05-06 PROCEDURE — 99283 EMERGENCY DEPT VISIT LOW MDM: CPT

## 2025-05-06 PROCEDURE — 99282 EMERGENCY DEPT VISIT SF MDM: CPT

## 2025-05-06 NOTE — ED PROVIDER NOTE - PATIENT PORTAL LINK FT
You can access the FollowMyHealth Patient Portal offered by [facility] by registering at the following website: [email or web address]. By joining "[employer]"’s FollowMyHealth portal, you will also be able to view your health information using other applications (apps) compatible with our system.

## 2025-05-06 NOTE — ED PROVIDER NOTE - MDM ORDERS SUBMITTED SELECTION
I will SWITCH the dose or number of times a day I take the medications listed below when I get home from the hospital:    Ventolin Nebules 2.5 mg/3 mL (0.083%) inhalation solution  -- 2 puff(s) inhaled every 6 hours, As Needed
Not Applicable

## 2025-05-06 NOTE — ED PROVIDER NOTE - OBJECTIVE STATEMENT
44-year-old female with history of anxiety complaining of allergic reaction started yesterday.  She noticed some redness and itching of her forearms and thighs.  No throat or tongue swelling or itching.  No shortness of breath.  No feeling dizzy.  No nausea vomiting or abdominal pain.  Patient was seen here 5 days ago for URI symptoms.  She was diagnosed with a UTI and was started on cephalexin which she had taken for about 3 days.  No other new medications.  No other new exposures recalled.  She saw urgent care for her allergic symptoms today and was told to stop cephalexin.  She was prescribed prednisone and Bactrim which she has not started yet.  She took 125 mg Benadryl at noon which did not help so much.

## 2025-05-06 NOTE — ED ADULT TRIAGE NOTE - CHIEF COMPLAINT QUOTE
Patient presents to Ed with complaint of generalized  itchiness and redness. Patient states she recently started a new antibiotic x 2 days prior. Alert and oriented x 4. Denies throat tightness or difficulty breathing.

## 2025-05-06 NOTE — ED PROVIDER NOTE - CLINICAL SUMMARY MEDICAL DECISION MAKING FREE TEXT BOX
44-year-old female with history of anxiety complaining of allergic reaction started yesterday.  She noticed some redness and itching of her forearms and thighs.  No throat or tongue swelling or itching.  No shortness of breath.  No feeling dizzy.  No nausea vomiting or abdominal pain.  Patient was seen here 5 days ago for URI symptoms.  She was diagnosed with a UTI and was started on cephalexin which she had taken for about 3 days.  No other new medications.  No other new exposures recalled.  She saw urgent care for her allergic symptoms today and was told to stop cephalexin.  She was prescribed prednisone and Bactrim which she has not started yet.  She took 125 mg Benadryl at noon which did not help so much.    Patient well-appearing.  Vitals unremarkable.  She has some generalized erythema of her forearm and thighs, nontender.  No oropharyngeal or airway symptoms or swelling.  Partial DDx: Allergy to cephalexin, viral rash.  Not consistent with cellulitis.  Recommend patient take prednisone and Bactrim as prescribed and stop Keflex as was already instructed.  Advised patient to take up to 50 mg of Benadryl for allergy symptoms.  Return to any ED for worsening or airway symptoms

## 2025-05-06 NOTE — ED ADULT NURSE NOTE - OBJECTIVE STATEMENT
Patient received A&Ox4, ambulatory with steady gait at the present. Patient complains of generalized  itchiness and redness. Patient states she recently started a new antibiotic x 2 days prior. Alert and oriented x 4. Denies throat tightness or difficulty breathing. Was seen at urgent care [facility] & was prescribed prednisone & a new abx.  Advised to take benadryl q6 for itchiness. Side rails up, call light in reach, safety maintained, comfort measures provided and MD evaluation in progress.

## 2025-05-06 NOTE — ED PROVIDER NOTE - NSFOLLOWUPINSTRUCTIONS_ED_ALL_ED_FT
- Stop taking cephalexin.  You may be allergic to this medication.  - Take trimethoprim/sulfamethoxazole for urinary infection instead  - Take prednisone as also prescribed by urgent care  - Take Benadryl 25 to 50 mg every 6 hours as needed for itching rash or allergic reaction  - Return for worsening symptoms, throat or tongue swelling, difficult breathing or other concerns    Follow Up in 1-3 Days with your own doctor or with  [address] [address]  Phone: [phone]      General Allergic Reaction    WHAT YOU NEED TO KNOW:    An allergic reaction is your body's response to an allergen. Allergens include medicines, food, insect stings, animal dander, mold, latex, chemicals, and dust mites. Pollen from trees, grass, and weeds can also cause an allergic reaction. An allergic reaction can range from mild to severe.    DISCHARGE INSTRUCTIONS:    Call 911 for signs or symptoms of anaphylaxis, such as trouble breathing, swelling in your mouth or throat, or wheezing. You may also have itching, a rash, hives, or feel like you are going to faint.    Return to the emergency department if:     You have a skin rash, hives, swelling, or itching that is starting to get worse.      Your throat tightens, or your lips or tongue swell.      You have trouble swallowing or speaking.      You have worsening nausea, diarrhea, or abdominal cramps, or you are vomiting.      You have chest pain or tightness.    Contact your healthcare provider if:     You have questions or concerns about your condition or care.        Medicines: You may need any of the following:     Medicines may be given to relieve certain allergy symptoms such as itching, sneezing, and swelling. You may take them as a pill or use drops in your nose or eyes. Topical treatments may be given to put directly on your skin to help decrease itching or swelling.      Epinephrine may be prescribed if you are at risk for anaphylaxis. This is a severe allergic reaction that can be life-threatening. Your healthcare provider will tell you if you need to keep epinephrine with you. You will be taught when and how to use it.      Take your medicine as directed. Contact your healthcare provider if you think your medicine is not helping or if you have side effects. Tell him of her if you are allergic to any medicine. Keep a list of the medicines, vitamins, and herbs you take. Include the amounts, and when and why you take them. Bring the list or the pill bottles to follow-up visits. Carry your medicine list with you in case of an emergency.    Follow up with your healthcare provider as directed: Write down your questions so you remember to ask them during your visits.     Manage your symptoms:     Avoid allergens. You may need to have allergy testing with your healthcare provider or a specialist to find your allergens.      Use cold compresses on your skin or eyes. This will help soothe skin or eyes affected by the allergic reaction. You can make a cold compress by soaking a washcloth in cool water. Wring out the extra water before you apply the washcloth.      Rinse your nasal passages with a saline solution. Daily rinsing may help clear allergens out of your nose. Use distilled water if possible. You can also boil tap water and then let it cool before you use it. Do not use tap water without boiling it first.      Do not smoke. Nicotine and other chemicals in cigarettes and cigars can make an allergic reaction worse, and can also cause lung damage. Ask your healthcare provider for information if you currently smoke and need help to quit. E-cigarettes or smokeless tobacco still contain nicotine. Talk to your healthcare provider before you use these products.

## 2025-05-08 LAB
CULTURE RESULTS: SIGNIFICANT CHANGE UP
CULTURE RESULTS: SIGNIFICANT CHANGE UP
SPECIMEN SOURCE: SIGNIFICANT CHANGE UP
SPECIMEN SOURCE: SIGNIFICANT CHANGE UP

## (undated) DEVICE — DRAPE LAVH 124" X 30" X125"

## (undated) DEVICE — CANISTER DISPOSABLE THIN WALL 3000CC

## (undated) DEVICE — SYR LUER LOK 10CC

## (undated) DEVICE — WARMING BLANKET UPPER ADULT

## (undated) DEVICE — SPECIMEN CONTAINER 100ML

## (undated) DEVICE — TUBING TUR 2 PRONG

## (undated) DEVICE — URETERAL CATH RED RUBBER 12FR (WHITE)

## (undated) DEVICE — PACK LITHOTOMY

## (undated) DEVICE — POSITIONER STRAP ARMBOARD VELCRO TS-30

## (undated) DEVICE — PRESSURE INFUSOR BAG 1000ML

## (undated) DEVICE — SOL IRR BAG NS 0.9% 1000ML